# Patient Record
Sex: FEMALE | Race: BLACK OR AFRICAN AMERICAN | Employment: FULL TIME | URBAN - METROPOLITAN AREA
[De-identification: names, ages, dates, MRNs, and addresses within clinical notes are randomized per-mention and may not be internally consistent; named-entity substitution may affect disease eponyms.]

---

## 2017-05-19 ENCOUNTER — TELEPHONE (OUTPATIENT)
Dept: OBGYN CLINIC | Age: 32
End: 2017-05-19

## 2017-05-19 NOTE — TELEPHONE ENCOUNTER
If this is the first time it has happened then I would just monitor.   If it recurs or she does not stop bleeding after 1 week then needs to schedule appointment for exam.

## 2017-05-19 NOTE — TELEPHONE ENCOUNTER
Call received at  12:49pm            TP 33year old patient last seen in the office on 8/23/16. Patient states her cycle ended on 5/5/17 and for the past 5-6 days she has been cramping and now today she is spotting when wiping . Patient reports this is unusual for her . Patient states she did a UPT this am and it was negative. Patient is wondering whether she needs to be seen.  Please advise

## 2017-05-30 ENCOUNTER — OFFICE VISIT (OUTPATIENT)
Dept: OBGYN CLINIC | Age: 32
End: 2017-05-30

## 2017-05-30 VITALS
WEIGHT: 265 LBS | DIASTOLIC BLOOD PRESSURE: 72 MMHG | HEIGHT: 65 IN | SYSTOLIC BLOOD PRESSURE: 118 MMHG | TEMPERATURE: 98.3 F | BODY MASS INDEX: 44.15 KG/M2

## 2017-05-30 DIAGNOSIS — R10.2 PELVIC PAIN: Primary | ICD-10-CM

## 2017-05-30 DIAGNOSIS — R35.0 URINARY FREQUENCY: ICD-10-CM

## 2017-05-30 DIAGNOSIS — N89.8 VAGINAL DISCHARGE: ICD-10-CM

## 2017-05-30 DIAGNOSIS — N93.9 ABNORMAL UTERINE BLEEDING: ICD-10-CM

## 2017-05-30 DIAGNOSIS — R39.15 URINARY URGENCY: ICD-10-CM

## 2017-05-30 LAB
BILIRUB UR QL STRIP: NEGATIVE
GLUCOSE UR-MCNC: NEGATIVE MG/DL
HCG URINE, QL. (POC): NEGATIVE
KETONES P FAST UR STRIP-MCNC: NORMAL MG/DL
PH UR STRIP: 4.6 [PH] (ref 4.6–8)
PROT UR QL STRIP: NEGATIVE MG/DL
SP GR UR STRIP: 1 (ref 1–1.03)
UA UROBILINOGEN AMB POC: NORMAL (ref 0.2–1)
URINALYSIS CLARITY POC: CLEAR
URINALYSIS COLOR POC: YELLOW
URINE BLOOD POC: NEGATIVE
URINE LEUKOCYTES POC: NEGATIVE
URINE NITRITES POC: NEGATIVE
VALID INTERNAL CONTROL?: YES
WET MOUNT POCT, WMPOCT: NORMAL

## 2017-05-30 RX ORDER — FLUCONAZOLE 150 MG/1
150 TABLET ORAL DAILY
Qty: 1 TAB | Refills: 0 | Status: SHIPPED | OUTPATIENT
Start: 2017-05-30 | End: 2021-02-18

## 2017-05-30 RX ORDER — IBUPROFEN 800 MG/1
800 TABLET ORAL
COMMUNITY
End: 2021-02-23 | Stop reason: ALTCHOICE

## 2017-05-30 RX ORDER — METRONIDAZOLE 500 MG/1
500 TABLET ORAL 2 TIMES DAILY
Qty: 28 TAB | Refills: 0 | Status: SHIPPED | OUTPATIENT
Start: 2017-05-30 | End: 2017-06-13

## 2017-05-30 RX ORDER — DOXYCYCLINE 100 MG/1
100 CAPSULE ORAL 2 TIMES DAILY
Qty: 28 CAP | Refills: 0 | Status: SHIPPED | OUTPATIENT
Start: 2017-05-30 | End: 2017-06-13

## 2017-05-30 NOTE — MR AVS SNAPSHOT
Visit Information Date & Time Provider Department Dept. Phone Encounter #  
 5/30/2017  1:00 PM MD Mikc Batista [phone] [id number] Your Appointments 9/7/2017  8:00 AM  
ESTABLISHED PATIENT with MD Mick Batista ([address]) Appt Note: ae   TP  
 [address] [facility] [address] [facility] [phone]  
[facility] 31 [address] Upcoming Health Maintenance Date Due  
 PAP AKA CERVICAL CYTOLOGY 6/26/2017 INFLUENZA AGE 9 TO ADULT 8/1/2017 Allergies as of 5/30/2017  Review Complete On: 5/30/2017 By: Brooks Leon LPN Severity Noted Reaction Type Reactions Penicillins  05/30/2017    Hives Current Immunizations  Never Reviewed No immunizations on file. Not reviewed this visit You Were Diagnosed With   
  
 Codes Comments Pelvic pain    -  Primary ICD-10-CM: R10.2 ICD-9-CM: [id number] Vaginal discharge     ICD-10-CM: N89.8 ICD-9-CM: 623.5 Vitals BP Height(growth percentile) Weight(growth percentile) LMP BMI OB Status 118/72 5' 5\" (1.651 m) 265 lb (120.2 kg) 05/01/2017 (Exact Date) 44.1 kg/m2 Having regular periods Smoking Status Never Smoker BMI and BSA Data Body Mass Index Body Surface Area  
 44.1 kg/m 2 2.35 m 2 Preferred Pharmacy [facility] Name Phone [id number] [address] AT [address] [place] [address] [phone] Your Updated Medication List  
  
   
This list is accurate as of: 5/30/17  1:27 PM.  Always use your most recent med list.  
  
  
  
  
 doxycycline 100 mg capsule Commonly known as:  Sheila Hugh Take 1 Cap by mouth two (2) times a day for 14 days. ibuprofen 800 mg tablet Commonly known as:  MOTRIN Take  by mouth.  
  
 metroNIDAZOLE 500 mg tablet Commonly known as:  FLAGYL Take 1 Tab by mouth two (2) times a day for 14 days. NATURE-THROID 16.25 mg Tab Generic drug:  Thyroid (Pork) Prescriptions Sent to Pharmacy Refills  
 doxycycline (MONODOX) 100 mg capsule 0 Sig: Take 1 Cap by mouth two (2) times a day for 14 days. Class: Normal  
 Pharmacy: [facility]/pharmacy #[id number] AT [address] [place] [address] Ph #: [phone] Route: Oral  
 metroNIDAZOLE (FLAGYL) 500 mg tablet 0 Sig: Take 1 Tab by mouth two (2) times a day for 14 days. Class: Normal  
 Pharmacy: [facility]/pharmacy #[id number] AT [address] [place] [address] Ph #: [phone] Route: Oral  
  
We Performed the Following AMB POC SMEAR, STAIN & Jamel Billing MOUNT [id number] CPT(R)] CBC WITH AUTOMATED DIFF [13483 CPT(R)] [address] [id number] Custom] Patient Instructions Pelvic Inflammatory Disease: Care Instructions Your Care Instructions Pelvic inflammatory disease, or PID, is an infection of a womans fallopian tubes and other reproductive organs. PID is usually caused by a sexually transmitted infection (STI), such as gonorrhea or chlamydia. PID can cause scars in the fallopian tubes, making it hard for a woman to get pregnant. Having one STI increases your risk for other STIs, such as genital herpes, genital warts, syphilis, and HIV. It is important to take all the medicine that was prescribed. PID can cause serious health problems if you do not complete your treatment. Follow-up care is a key part of your treatment and safety. Be sure to make and go to all appointments, and call your doctor if you are having problems. Its also a good idea to know your test results and keep a list of the medicines you take. How can you care for yourself at home? · Take your antibiotics as directed. Do not stop taking them just because you feel better. You need to take the full course of antibiotics. · Rest until your fever and pain have improved. · Take pain medicines exactly as directed. ¨ If the doctor gave you a prescription medicine for pain, take it as prescribed. ¨ If you are not taking a prescription pain medicine, ask your doctor if you can take an over-the-counter medicine. · Use a hot water bottle or a heating pad (set on low) on your belly for pain. · Do not douche. · Do not have sex or use tampons (you can use pads instead) until you have taken all the medicine, your pain is gone, and you feel completely well. · Talk to any sex partners you have had in the past 2 months. They need to be tested and may need to be treated for STIs. To prevent STIs · Use latex condoms every time you have sex. Use them from the beginning to the end of sexual contact. · Talk to your partner before you have sex. Find out if he or she has or is at risk for any sexually transmitted infection (STI). Keep in mind that a person may be able to spread an STI even if he or she does not have symptoms. · Do not have sex with anyone who has symptoms of an STI, such as sores on the genitals or mouth. · Having one sex partner (who does not have STIs and does not have sex with anyone else) is a good way to avoid STIs. When should you call for help? Call your doctor now or seek immediate medical care if: 
· You have a new or higher fever. · Your pain gets worse. · You think you may be pregnant. Watch closely for changes in your health, and be sure to contact your doctor if: 
· You vomit or have diarrhea. · You are not getting better after 2 days. Where can you learn more? Go to http://arden-celine.info/. Enter N294 in the search box to learn more about \"Pelvic Inflammatory Disease: Care Instructions. \" Current as of: October 13, 2016 Content Version: 11.2 © 0382-7412 Zapstitch.  Care instructions adapted under license by Yoolink (which disclaims liability or warranty for this information). If you have questions about a medical condition or this instruction, always ask your healthcare professional. [address] any warranty or liability for your use of this information. Introducing [facility] & HEALTH SERVICES! Dear [facility]: Thank you for requesting a Cuculus account. Our records indicate that you already have an active Cuculus account. You can access your account anytime at [email or web address] Did you know that you can access your hospital and ER discharge instructions at any time in Cuculus? You can also review all of your test results from your hospital stay or ER visit. Additional Information If you have questions, please visit the Frequently Asked Questions section of the Cuculus website at [email or web address] Remember, Cuculus is NOT to be used for urgent needs. For medical emergencies, dial 911. Now available from your iPhone and Android! Please provide this summary of care documentation to your next provider. Your primary care clinician is listed as NOT ON FILE. If you have any questions after today's visit, please call [phone].

## 2017-05-30 NOTE — PROGRESS NOTES
[address] [facility]  [email or web address]  [phone]    Osmany Cowan MD, FACOG       OB/GYN Problem visit    Chief Complaint:   Chief Complaint   Patient presents with   [facility] ED Follow-up    Pelvic Pain    Irregular Menses       History of Present Illness: This is a new problem being evaluated by this provider. The patient is a 33 y.o. [de-identified]  female who reports having spotting X1 day 2 weeks ago with severe pelvice pain that took her to the ER on Thursday 5/25/17. Patient reports that she also has lower back pain, urinary urgency, urinary frequency, and bloated/swollen abdomen. She reports the symptoms are is unchanged. Aggravating factors include pelvic exam.   Alleviating factors include Ibuprofen 800mg, hydrocodone  +new sexual partner    She does not have other concerns. LMP: Patient's last menstrual period was 05/01/2017 (exact date). PFSH:  Past Medical History:   Diagnosis Date    Chlamydia     Hirsutism     History of PCOS     Hypothyroidism     Pap smear for cervical cancer screening 1/24/12    negative    Pap smear for cervical cancer screening 6/26/14    negative hpv negative     No past surgical history on file. Family History   Problem Relation Age of Onset    Hypertension Mother     Coronary Artery Disease Mother     Osteoporosis Mother     No Known Problems Father     Osteoporosis Maternal Aunt     Breast Cancer Paternal Aunt     Breast Cancer Other      Social History   Substance Use Topics    Smoking status: Never Smoker    Smokeless tobacco: Never Used    Alcohol use No     Allergies   Allergen Reactions    Penicillins Hives     Current Outpatient Prescriptions   Medication Sig    ibuprofen (MOTRIN) 800 mg tablet Take  by mouth.  doxycycline (MONODOX) 100 mg capsule Take 1 Cap by mouth two (2) times a day for 14 days.  metroNIDAZOLE (FLAGYL) 500 mg tablet Take 1 Tab by mouth two (2) times a day for 14 days.     fluconazole (DIFLUCAN) 150 mg tablet Take 1 Tab by mouth daily.  NATURE-THROID 16.25 mg tab      No current facility-administered medications for this visit. Review of Systems:  History obtained from the patient  Constitutional: negative for fevers, chills and weight loss  ENT ROS: negative for - hearing change, oral lesions or visual changes  Respiratory: negative for cough, wheezing or dyspnea on exertion  Cardiovascular: negative for chest pain, irregular heart beats, exertional chest pressure/discomfort  Gastrointestinal: negative for dysphagia, nausea and vomiting  Genito-Urinary ROS:  see HPI  Inteument/breast: negative for rash, breast lump and nipple discharge  Musculoskeletal:negative for stiff joints, neck pain and muscle weakness  Endocrine ROS: negative for - breast changes, galactorrhea or temperature intolerance  Hematological and Lymphatic ROS: negative for - blood clots, bruising or swollen lymph nodes    Physical Exam:  Visit Vitals    /72    Temp 98.3 °F (36.8 °C)    Ht 5' 5\" (1.651 m)    Wt 265 lb (120.2 kg)    BMI 44.1 kg/m2       GENERAL: alert, well appearing, and in no distress  HEAD: normocephalic, atraumatic. PULM: clear to auscultation, no wheezes, rales or rhonchi, symmetric air entry   COR: normal rate and regular rhythm, S1 and S2 normal   ABDOMEN: soft, nontender, nondistended, no masses or organomegaly   EGBUS: no lesions, no inflammation, no masses  VULVA: normal appearing vulva with no masses, tenderness or lesions  VAGINA: normal appearing vagina with normal color, no lesions, white and thin discharge  CERVIX: normal appearing cervix without discharge or lesions, mild CMT   UTERUS: uterus is normal size, shape, consistency and nontender   ADNEXA: normal adnexa in size, nontender and no masses  NEURO: alert, oriented, normal speech    Assessment:  Encounter Diagnoses   Name Primary?     Pelvic pain Yes    Vaginal discharge     Abnormal uterine bleeding     Urinary frequency     Urinary urgency        Plan:  The patient is advised that she should contact the office if she does not note improvement or if symptoms recur  Recommend follow up with PCP for non-gynecologic complaints and chronic medical problems. She should contact our office with any questions or concerns  She could keep her routine annual exam appointment. We discussed potential causes of lower abdominal/pelvic pain: GYN, GI, , musculoskeletal, infectious process, adhesions, or other etiology  We discussed evaluation of lower abdominal/pelvic pain: including but not limited to observation, surgical evaluation/laparoscopy, imaging   We discussed treatment of lower abdominal/pelvic pain: including but not limited to: pain medication, hormonal management, surgical intervention, bowel regimen. Since pain can be a symptoms of an underlying abnormal process she is encouraged to contact my office with persistent symptoms for additional evaluation and treatment if needed. Reviewed outside ER records: urine, +bacteria,   CT: \"normal\" genito urinary and abd  No abx given  We discussed potential causes of symptomatic bleeding: including but not limited to hormonal, medical, infection/inflammation and structural etiologies.   Pain precautions  Appendicitis precautions  Notify MD if NI      Orders Placed This Encounter    CBC WITH AUTOMATED DIFF    NUSWAB VAGINITIS PLUS    AMB POC WET PREP (AKA STAIN, INTERPRET, WET MOUNT)    AMB POC URINALYSIS DIP STICK MANUAL W/O MICRO    AMB POC URINE PREGNANCY TEST, VISUAL COLOR COMPARISON    doxycycline (MONODOX) 100 mg capsule    metroNIDAZOLE (FLAGYL) 500 mg tablet    fluconazole (DIFLUCAN) 150 mg tablet       Results for orders placed or performed in visit on 05/30/17   AMB POC SMEAR, STAIN & INTERPRET, WET MOUNT   Result Value Ref Range    Wet mount (POC)      Narrative    BRIAN    Hypae: negative  Buds: negative    Wet Prep:  Trich: negative  Clue cells: negative  Hyphae: negative  Buds: negative  WBC's: normal     AMB POC URINALYSIS DIP STICK MANUAL W/O MICRO   Result Value Ref Range    Color (UA POC) Yellow     Clarity (UA POC) Clear     Glucose (UA POC) Negative Negative    Bilirubin (UA POC) Negative Negative    Ketones (UA POC) Trace Negative    Specific gravity (UA POC) 1.001 1.001 - 1.035    Blood (UA POC) Negative Negative    pH (UA POC) 4.6 4.6 - 8.0    Protein (UA POC) Negative Negative mg/dL    Urobilinogen (UA POC) normal 0.2 - 1    Nitrites (UA POC) Negative Negative    Leukocyte esterase (UA POC) Negative Negative   AMB POC URINE PREGNANCY TEST, VISUAL COLOR COMPARISON   Result Value Ref Range    VALID INTERNAL CONTROL POC Yes     HCG urine, Ql. (POC) Negative Negative

## 2017-05-31 LAB
BASOPHILS # BLD AUTO: 0 X10E3/UL (ref 0–0.2)
BASOPHILS NFR BLD AUTO: 0 %
EOSINOPHIL # BLD AUTO: 0.3 X10E3/UL (ref 0–0.4)
EOSINOPHIL NFR BLD AUTO: 3 %
ERYTHROCYTE [DISTWIDTH] IN BLOOD BY AUTOMATED COUNT: 13.8 % (ref 12.3–15.4)
HCT VFR BLD AUTO: 40.4 % (ref 34–46.6)
HGB BLD-MCNC: 14 G/DL (ref 11.1–15.9)
IMM GRANULOCYTES # BLD: 0 X10E3/UL (ref 0–0.1)
IMM GRANULOCYTES NFR BLD: 0 %
LYMPHOCYTES # BLD AUTO: 2.9 X10E3/UL (ref 0.7–3.1)
LYMPHOCYTES NFR BLD AUTO: 29 %
MCH RBC QN AUTO: 29.9 PG (ref 26.6–33)
MCHC RBC AUTO-ENTMCNC: 34.7 G/DL (ref 31.5–35.7)
MCV RBC AUTO: 86 FL (ref 79–97)
MONOCYTES # BLD AUTO: 0.5 X10E3/UL (ref 0.1–0.9)
MONOCYTES NFR BLD AUTO: 5 %
NEUTROPHILS # BLD AUTO: 6.3 X10E3/UL (ref 1.4–7)
NEUTROPHILS NFR BLD AUTO: 63 %
PLATELET # BLD AUTO: 280 X10E3/UL (ref 150–379)
RBC # BLD AUTO: 4.69 X10E6/UL (ref 3.77–5.28)
WBC # BLD AUTO: 10.1 X10E3/UL (ref 3.4–10.8)

## 2017-06-02 ENCOUNTER — TELEPHONE (OUTPATIENT)
Dept: OBGYN CLINIC | Age: 32
End: 2017-06-02

## 2017-06-02 LAB
A VAGINAE DNA VAG QL NAA+PROBE: NORMAL SCORE
BVAB2 DNA VAG QL NAA+PROBE: NORMAL SCORE
C ALBICANS DNA VAG QL NAA+PROBE: NEGATIVE
C GLABRATA DNA VAG QL NAA+PROBE: NEGATIVE
C TRACH RRNA SPEC QL NAA+PROBE: NEGATIVE
MEGA1 DNA VAG QL NAA+PROBE: NORMAL SCORE
N GONORRHOEA RRNA SPEC QL NAA+PROBE: NEGATIVE
T VAGINALIS RRNA SPEC QL NAA+PROBE: NEGATIVE

## 2017-06-02 NOTE — TELEPHONE ENCOUNTER
LMTCB    ----- Message from Fernando Lou MD sent at 6/2/2017 10:01 AM EDT -----  The results are normal.   Please notify patient. Recommend f/u if still having symptoms/problems or has additional concerns.

## 2017-06-02 NOTE — PROGRESS NOTES
Patient returned call and was given the results per MD recommendation. She verbalized understanding.

## 2017-06-07 ENCOUNTER — OFFICE VISIT (OUTPATIENT)
Dept: OBGYN CLINIC | Age: 32
End: 2017-06-07

## 2017-06-07 VITALS
WEIGHT: 263 LBS | BODY MASS INDEX: 43.82 KG/M2 | SYSTOLIC BLOOD PRESSURE: 118 MMHG | HEIGHT: 65 IN | DIASTOLIC BLOOD PRESSURE: 74 MMHG

## 2017-06-07 DIAGNOSIS — M25.552 PAIN OF BOTH HIP JOINTS: ICD-10-CM

## 2017-06-07 DIAGNOSIS — M25.551 PAIN OF BOTH HIP JOINTS: ICD-10-CM

## 2017-06-07 DIAGNOSIS — R10.2 PELVIC PAIN IN FEMALE: Primary | ICD-10-CM

## 2017-06-07 DIAGNOSIS — M54.50 BILATERAL LOW BACK PAIN WITHOUT SCIATICA, UNSPECIFIED CHRONICITY: ICD-10-CM

## 2017-06-07 NOTE — MR AVS SNAPSHOT
Visit Information Date & Time Provider Department Dept. Phone Encounter #  
 6/7/2017 10:50 AM MD Mick Salazar [phone] [id number] Your Appointments 9/7/2017  8:00 AM  
ESTABLISHED PATIENT with MD Mick Salazar ([facility]) Appt Note: ae   TP  
 [address] [facility] [address] [facility] [phone]  
[facility] 31 [address] Upcoming Health Maintenance Date Due  
 PAP AKA CERVICAL CYTOLOGY 6/26/2017 INFLUENZA AGE 9 TO ADULT 8/1/2017 Allergies as of 6/7/2017  Review Complete On: 6/7/2017 By: Kyle Sheffield LPN Severity Noted Reaction Type Reactions Penicillins  05/30/2017    Hives Current Immunizations  Never Reviewed No immunizations on file. Not reviewed this visit Vitals BP Height(growth percentile) Weight(growth percentile) LMP BMI OB Status 118/74 5' 5\" (1.651 m) 263 lb (119.3 kg) 05/01/2017 (Exact Date) 43.77 kg/m2 Having regular periods Smoking Status Never Smoker BMI and BSA Data Body Mass Index Body Surface Area 43.77 kg/m 2 2.34 m 2 Preferred Pharmacy Pharmacy Name Phone [id number] [address] [facility] AT [address] [place], [place] [phone] Your Updated Medication List  
  
   
This list is accurate as of: 6/7/17 11:13 AM.  Always use your most recent med list.  
  
  
  
  
 doxycycline 100 mg capsule Commonly known as:  Pearla Getting Take 1 Cap by mouth two (2) times a day for 14 days. fluconazole 150 mg tablet Commonly known as:  DIFLUCAN Take 1 Tab by mouth daily. ibuprofen 800 mg tablet Commonly known as:  MOTRIN Take  by mouth.  
  
 metroNIDAZOLE 500 mg tablet Commonly known as:  FLAGYL Take 1 Tab by mouth two (2) times a day for 14 days. NATURE-THROID 16.25 mg Tab Generic drug:  Thyroid (Pork) Patient Instructions Pelvic Pain: Care Instructions Your Care Instructions Pelvic pain, or pain in the lower belly, can have many causes. Often pelvic pain is not serious and gets better in a few days. If your pain continues or gets worse, you may need tests and treatment. Tell your doctor about any new symptoms. These may be signs of a serious problem. Follow-up care is a key part of your treatment and safety. Be sure to make and go to all appointments, and call your doctor if you are having problems. It's also a good idea to know your test results and keep a list of the medicines you take. How can you care for yourself at home? · Rest until you feel better. Lie down, and raise your legs by placing a pillow under your knees. · Drink plenty of fluids. You may find that small, frequent sips are easier on your stomach than if you drink a lot at once. Avoid drinks with carbonation or caffeine, such as soda pop, tea, or coffee. · Try eating several small meals instead of 2 or 3 large ones. Eat mild foods, such as rice, dry toast or crackers, bananas, and applesauce. Avoid fatty and spicy foods, other fruits, and alcohol until 48 hours after your symptoms have gone away. · Take an over-the-counter pain medicine, such as acetaminophen (Tylenol), ibuprofen (Advil, Motrin), or naproxen (Aleve). Read and follow all instructions on the label. · Do not take two or more pain medicines at the same time unless the doctor told you to. Many pain medicines have acetaminophen, which is Tylenol. Too much acetaminophen (Tylenol) can be harmful. · You can put a heating pad, a warm cloth, or moist heat on your belly to relieve pain. When should you call for help? Call 911 anytime you think you may need emergency care. For example, call if: 
· You passed out (lost consciousness). Call your doctor now or seek immediate medical care if: 
· Your pain is getting worse. · Your pain becomes focused in one area of your belly. · You have severe vaginal bleeding. Severe means that you are soaking through your usual pads or tampons every hour for 2 or more hours and passing clots of blood. · You have new symptoms such as fever, urinary problems or unexpected vaginal bleeding. · You are dizzy or lightheaded, or you feel like you may faint. Watch closely for changes in your health, and be sure to contact your doctor if: 
· You do not get better as expected. Where can you learn more? Go to http://arden-celine.info/. Enter [phone] in the search box to learn more about \"Pelvic Pain: Care Instructions. \" Current as of: October 13, 2016 Content Version: 11.2 © 5167-9331 Dely. Care instructions adapted under license by Nomiku (which disclaims liability or warranty for this information). If you have questions about a medical condition or this instruction, always ask your healthcare professional. [place] [address] any warranty or liability for your use of this information. Introducing hospitals & HEALTH SERVICES! Dear Sue Mata: Thank you for requesting a Dispatch account. Our records indicate that you already have an active Dispatch account. You can access your account anytime at [email or web address] Did you know that you can access your hospital and ER discharge instructions at any time in Dispatch? You can also review all of your test results from your hospital stay or ER visit. Additional Information If you have questions, please visit the Frequently Asked Questions section of the Dispatch website at [email or web address] Remember, Dispatch is NOT to be used for urgent needs. For medical emergencies, dial 911. Now available from your iPhone and Android! Please provide this summary of care documentation to your next provider. Your primary care clinician is listed as NOT ON FILE. If you have any questions after today's visit, please call [phone].

## 2017-06-07 NOTE — PATIENT INSTRUCTIONS
Pelvic Pain: Care Instructions  Your Care Instructions    Pelvic pain, or pain in the lower belly, can have many causes. Often pelvic pain is not serious and gets better in a few days. If your pain continues or gets worse, you may need tests and treatment. Tell your doctor about any new symptoms. These may be signs of a serious problem. Follow-up care is a key part of your treatment and safety. Be sure to make and go to all appointments, and call your doctor if you are having problems. It's also a good idea to know your test results and keep a list of the medicines you take. How can you care for yourself at home? · Rest until you feel better. Lie down, and raise your legs by placing a pillow under your knees. · Drink plenty of fluids. You may find that small, frequent sips are easier on your stomach than if you drink a lot at once. Avoid drinks with carbonation or caffeine, such as soda pop, tea, or coffee. · Try eating several small meals instead of 2 or 3 large ones. Eat mild foods, such as rice, dry toast or crackers, bananas, and applesauce. Avoid fatty and spicy foods, other fruits, and alcohol until 48 hours after your symptoms have gone away. · Take an over-the-counter pain medicine, such as acetaminophen (Tylenol), ibuprofen (Advil, Motrin), or naproxen (Aleve). Read and follow all instructions on the label. · Do not take two or more pain medicines at the same time unless the doctor told you to. Many pain medicines have acetaminophen, which is Tylenol. Too much acetaminophen (Tylenol) can be harmful. · You can put a heating pad, a warm cloth, or moist heat on your belly to relieve pain. When should you call for help? Call 911 anytime you think you may need emergency care. For example, call if:  · You passed out (lost consciousness). Call your doctor now or seek immediate medical care if:  · Your pain is getting worse. · Your pain becomes focused in one area of your belly.   · You have severe vaginal bleeding. Severe means that you are soaking through your usual pads or tampons every hour for 2 or more hours and passing clots of blood. · You have new symptoms such as fever, urinary problems or unexpected vaginal bleeding. · You are dizzy or lightheaded, or you feel like you may faint. Watch closely for changes in your health, and be sure to contact your doctor if:  · You do not get better as expected. Where can you learn more? Go to http://arden-celine.info/. Enter [phone] in the search box to learn more about \"Pelvic Pain: Care Instructions. \"  Current as of: October 13, 2016  Content Version: 11.2  © 1358-6738 WalletKit, Wizpert. Care instructions adapted under license by Push Computing (which disclaims liability or warranty for this information). If you have questions about a medical condition or this instruction, always ask your healthcare professional. [address] any warranty or liability for your use of this information.

## 2017-06-07 NOTE — PROGRESS NOTES
[facility]-GYN  [email or web address]    Osmany Cowan MD, [address] [facility]       OB/GYN Follow-up visit    Chief Complaint: Follow up visit  Chief Complaint   Patient presents with    Pelvic Pain    Back Pain       History of Present Illness: This is a follow up visit from 5/30/17. She is having a follow up for pelvic pain and bloating. Pain is radiating to around umbilicus and bilateral lower back. The patient reports having pelvic pain, lower back pain, and bloating. She reports the symptoms are is moderately worse. Aggravating factors include none. Alleviating factors include ibuprofen helps somewhat. She does not have other concerns. Has tried to conceive for several months in the past, she is worried about future fertility. +FH fibroids. AUB resolved  Thyroid: normal per pt      LMP: Patient's last menstrual period was 05/01/2017 (exact date). PFSH:  Past Medical History:   Diagnosis Date    Chlamydia     Hirsutism     History of PCOS     Hypothyroidism     Pap smear for cervical cancer screening 1/24/12    negative    Pap smear for cervical cancer screening 6/26/14    negative hpv negative     No past surgical history on file. Family History   Problem Relation Age of Onset    Hypertension Mother     Coronary Artery Disease Mother     Osteoporosis Mother     No Known Problems Father     Osteoporosis Maternal Aunt     Breast Cancer Paternal Aunt     Breast Cancer Other      Social History   Substance Use Topics    Smoking status: Never Smoker    Smokeless tobacco: Never Used    Alcohol use No     Allergies   Allergen Reactions    Penicillins Hives     Current Outpatient Prescriptions   Medication Sig    ibuprofen (MOTRIN) 800 mg tablet Take  by mouth.  doxycycline (MONODOX) 100 mg capsule Take 1 Cap by mouth two (2) times a day for 14 days.  metroNIDAZOLE (FLAGYL) 500 mg tablet Take 1 Tab by mouth two (2) times a day for 14 days.     NATURE-THROID 16.25 mg tab     fluconazole (DIFLUCAN) 150 mg tablet Take 1 Tab by mouth daily. No current facility-administered medications for this visit. Review of Systems:  History obtained from the patient  Constitutional: negative for fevers, chills and weight loss  ENT ROS: negative for - hearing change, oral lesions or visual changes  Respiratory: negative for cough, wheezing or dyspnea on exertion  Cardiovascular: negative for chest pain, irregular heart beats, exertional chest pressure/discomfort  Gastrointestinal: no constipation/diarrhea  Genito-Urinary ROS: see HPI  Inteument/breast: negative for rash, breast lump and nipple discharge  Musculoskeletal:negative for stiff joints, neck pain and muscle weakness  Endocrine ROS: negative for - breast changes, galactorrhea or temperature intolerance  Hematological and Lymphatic ROS: negative for - blood clots, bruising or swollen lymph nodes    Physical Exam:  Visit Vitals    /74    Ht 5' 5\" (1.651 m)    Wt 263 lb (119.3 kg)    BMI 43.77 kg/m2       GENERAL: alert, well appearing, and in no distress  PULM: clear to auscultation, no wheezes, rales or rhonchi, symmetric air entry   COR: normal rate and regular rhythm, S1 and S2 normal   ABDOMEN: soft, nontender, nondistended, no masses or organomegaly   EGBUS: no lesions, no inflammation, no masses  VULVA: normal appearing vulva with no masses, tenderness or lesions  VAGINA: normal appearing vagina with normal color, no lesions, no discharge  CERVIX: normal appearing cervix without discharge or lesions, non tender  UTERUS: uterus is normal size, shape, consistency and nontender   ADNEXA: normal adnexa in size, nontender and no masses  NEURO: alert, oriented, normal speech  BACK no cvat    Assessment:  Encounter Diagnoses   Name Primary?     Pelvic pain in female Yes    Bilateral low back pain without sciatica, unspecified chronicity     Pain of both hip joints        Plan:  The patient is advised that she should contact the office with any questions or concerns. She should make her routine annual gynecologic appointment if needed. Pain precautions  Disc that myoma is unlikely to be causing pain, but could contribute to cramping/AUB  Disc hormonal options for pelvic pain of unknown etiology  We disc that US does not rule out endometriosis   Disc option for HSG if infertility x 12+ months  HSG h/o given  PT referral  Disc considering exploring MS//GI etiology or trial of hormonal management  Disc safer NSAID dosing  Notify MD if NI  FU 2- 3 mos reevlaute sx or sooner prn  Discussed risks, benefits and alternatives of OCP: including but not limited to dvt/pe/mi/cva/ca/gi risks. We discussed progesterone only and non hormonal options for contraception including but not limited to condoms, IUDs, Nexplanon, and depo provera. We discussed potential causes of lower abdominal/pelvic pain: GYN, GI, , musculoskeletal, infectious process, adhesions, or other etiology  We discussed evaluation of lower abdominal/pelvic pain: including but not limited to observation, surgical evaluation/laparoscopy, imaging   We discussed treatment of lower abdominal/pelvic pain: including but not limited to: pain medication, hormonal management, surgical intervention, bowel regimen. Since pain can be a symptoms of an underlying abnormal process she is encouraged to contact my office with persistent symptoms for additional evaluation and treatment if needed. Disc laparoscopy or depo lupron to evaluate for endometriosis  Rec healthy diet regular exercise. Orders Placed This Encounter    REFERRAL TO PHYSICAL THERAPY       No results found for this visit on 06/07/17. Agustín Burns MD    Physician review of ultrasound performed by technician    Today's ultrasound report and images were reviewed and discussed with the patient.   Please see images and imaging report entered by technician in PACS for more detail and progress note and diagnosis entered by MD.    Fareed Ennis MD    DIFFICULT SCAN DUE TO PATIENT BODY HABITUS. BMI>40, LIMITED VISUALIZATION  TA SCAN PERFORMED. UTERUS IS ANTEVERTED AND NORMAL SIZE AND HETEROGENOUS IN ECHOGENECITY. THERE IS A  SONOLUCENT AREA NOTED IN THE POSTERIOR UTERINE WALL. ENDOMETRIUM MEASURES 11MM IN THICKNESS. RIGHT OVARY APPEARS WITHIN NORMAL LIMITS. LEFT OVARY IS NOT VISUALIZED. NO FREE FLUID SEEN IN THE CDS. A TV US WOULD BE HELPFUL. UTERUS IS ANTEVERTED, NORMAL IN SIZE AND HETEROGENOUS IN ECHOGENICITY. THERE IS WHAT  APPEARS TO BE A FIBROID. FIBROID, RIGHT, SEE MEASUREMENTS ABOVE.  ENDOMETRIUM MEASURES 12MM IN THICKNESS. THE FIBROID IN THE RIGHT ADNEXA APPEARS TO  EXTENT INTO THE ENDOMETRIUM. RIGHT AND LEFT OVARIES APPEAR TO CONTAIN MULTIPLE SUBCORTICAL ATRETIC FOLLICULAR  CYSTS. .  NO FREE FLUID SEEN IN THE CDS.

## 2017-07-05 ENCOUNTER — HOSPITAL ENCOUNTER (OUTPATIENT)
Dept: PHYSICAL THERAPY | Age: 32
Discharge: HOME | End: 2017-07-05
Payer: COMMERCIAL

## 2017-07-05 PROCEDURE — 97162 PT EVAL MOD COMPLEX 30 MIN: CPT | Performed by: PHYSICAL MEDICINE & REHABILITATION

## 2017-07-05 PROCEDURE — 97530 THERAPEUTIC ACTIVITIES: CPT | Performed by: PHYSICAL MEDICINE & REHABILITATION

## 2017-07-05 PROCEDURE — 97110 THERAPEUTIC EXERCISES: CPT | Performed by: PHYSICAL MEDICINE & REHABILITATION

## 2017-07-05 NOTE — PROGRESS NOTES
PT INITIAL EVALUATION NOTE 2-15    Patient Name: Atlee Snellen  Date:2017  : 1985  [x]  Patient  Verified  Payor: BLUE CROSS / Plan: [facility] PPO / Product Type: PPO /    In time:1045  Out time:1200  Total Treatment Time (min): 75  Visit #: 1     Treatment Area: Pelvic and perineal pain [R10.2]  Low back pain [M54.5]  Pain in right hip [M25.551]  Pain in left hip [M25.552]    SUBJECTIVE  Pain Level (0-10 scale): 3/10  Any medication changes, allergies to medications, adverse drug reactions, diagnosis change, or new procedure performed?: [] No    [x] Yes (see summary sheet for update)  Subjective:     Patient is a 33year old female with complaints of pain in the area of the \"right ovary\" that is constant but worse around the time of ovulation and when she is about to start her cycle. Pain is severe at times. She  has LBP with this. She has had pain radiate into the R lateral thigh when severe. Complains of heaviness in the uterus, also having new onset of constipation that is now constant. She has a history of IBS with diarrhea, the constipation is a significant change from her usual bowel habits. When she is flared the pain can be severe, she has been to the ER 2x due to pain. When flared it can take 4-5 days to calm back to baseline. History of low thyroid, on natural synthetic thyroid treatment which she feels is not working, reports possible PCOS. She has a cycle every 33 days. She is currently trying to conceive. She has been working with a  at a gym doing intense \"boot camp\" and boxing activities, recent 60lb weight loss. Her pain is limiting her ability to continue exercising regularly. She works in [employer], sits primarily. Pain is worse with sitting. She denies any bowel or bladder incontinence. She denies pain with intercourse. She states her pain started 17 (ER visit that day) insidiously. History of gastric ulcers - noted on endoscopy. Negative celiac. Abdominal CT scan (-)  Abdominal and transvaginal US (-)    PLOF: regular exercise  Mechanism of Injury: sudden, insidious  Previous Treatment/Compliance: NA  PMHx/Surgical Hx: No surgical history  Pt Goals: no pain with sitting  Barriers: BMI limits treatment approaches  Motivation: good   Substance use: none   FABQ Score: Late to appointment, no FOTO  Cognition: A & O x 4       OBJECTIVE/EXAMINATION    Posture: In standing shifts weight to L. Other Observations: Morbid obesity  Gait and Functional Mobility: slow gait    Palpation: tender with increased ms turgor along R QL, R lumbar paraspinals in standing. Tender B abdominal ms wall R >L, unable to assess hip flexor due to abdominal wall tenderness. No increased ms turgor noted abdominal ms wall. Joint Mobility: Unable to assess        Lumbar AROM:        R  L  Flexion    Fingertips to shins- painfree           Extension   Full            Side Bending   R SB full          L SB 75% stiff          Rotation   R Rot full  L Rot 50% Pain R QL             Aberrant movements:  Painful arc: [] Yes  [x] No  Instability catch: [] Yes  [x] No  Difficulty returning from flexion: [] Yes  [] No  Reversal of lumbopelvic rhythm: [x] Yes  [] No      LOWER QUARTER   MUSCLE STRENGTH  KEY       R  L  0 - No Contraction  L1, L2 Psoas  4/5  4/5      1 - Trace   L3 Quads  5/5  5/5      2 - Poor   L4 Tib Ant  4/5  5/5      3 - Fair    L5 EHL  4/5  5/5      4 - Good   S1 FHL  5/5  5/5      5 - Normal   S2 Hams  4/5  4/5              MMT:  Core strength: 4/5   Hip abduction:  4/5   Hip extension: 4/5    Neurological: Sensation:  *Absent R DTR patellar tendon. Brisk LE DTRs otherwise.    Special Tests:        Slump:  Pain R post/lateral R thigh   Tenzin: Tight hipflexor R         Chris: tight B ITB bilaterally     SLR: Passive SLR R reproduces pain R low back at 70degrees   MARLO: (+) R groin pain   (-) L       SI compression/ Distraction: Unable to assess   Active SLR: no pain, tight B hamstrings. 15 min Therapeutic Exercise:  [x] See flow sheet :   Rationale: increase ROM to improve the patients ability to decrease pain with sitting    15 min Therapeutic Activity:  []  See flow sheet : Review of her current workout routine. Pt is participating in an intense ex program placing heavy strain on hip flexors, low back, abdominals and pelvic floor. Concerned about R LE radicular pain with absent R PT reflex. Advised pt to discuss with , needs to decrease intensity of workout while participating in PT to better understand effects of PT intervention. Pt in agreement with this plan. Rationale: improve coordination, increase proprioception and decrease strain on structures causing pain  to improve the patients ability to tolerate sitting at work with no pain. With   [x] TE   [] TA   [] neuro   [] other: Patient Education: [x] Review HEP    [] Progressed/Changed HEP based on:   [] positioning   [] body mechanics   [] transfers   [] heat/ice application    [x] other: See written HEP in chart. Other Objective/Functional Measures: Today's evaluation focused on ortho/hip/abdominal wall. Pelvic exam deferred, may proceed next visit pending response to ortho exercises provided today. 45 minutes late to scheduled appointment. Pain Level (0-10 scale) post treatment: 2/10      ASSESSMENT:   Patient with a sudden insidious onset of R pelvic/abdominal/low back pain with some radiation into R LE. She has a history of IBS, possible PCOS, thyroid dysfunction which may be contributory. Today she presented with absent R patellar tendon DTR and R LE focal weakness in the L5/S1 myotomes, other DTR's brisk. She has a positive passive straight leg raise test.  She presented with acute tenderness to abdominal wall R > L as well as tenderness and increased ms turgor to R QL.  Hip flexor on the R is tight, R hip stress testing reproduces chief complaint pain. She is participating in an intense \"boot camp\" style exercise program which may be contributory. Today we focused on R hip flexor spasm, she has been given a home exercise stretching program.  I have advised her to work with her  to decrease the intensity of her workouts while we work to solve her R abdominal/pelvic pain.            [x]  See Plan of Jonh Blas, PT, MSPT   7/5/2017  10:53 AM

## 2017-07-05 NOTE — PROGRESS NOTES
[address] [facility] Immanuel Abarca  Phone: [phone]  Fax: [phone]    Plan of Care/ Statement of Necessity for Physical Therapy Services 2-15    Patient name: Sadie Ashley  : 1985  Provider#: [id number]  Referral source: Keith Lee MD      Medical/Treatment Diagnosis: Pelvic and perineal pain [R10.2]  Low back pain [M54.5]  Pain in right hip [M25.551]  Pain in left hip [M25.552]     Prior Hospitalization: see medical history     Comorbidities: Obesity, PCOS, thyroid dysfunction  Prior Level of Function: No R pelvic/abdominal pain  Medications: Verified on Patient Summary List    Start of Care: 17      Onset Date: 17      The Plan of Care and following information is based on the information from the initial evaluation. Assessment/ key information: Patient is a 33year old female with complaints of pain in the area of the \"right ovary\" that is constant but worse around the time of ovulation and when she is about to start her cycle. Pain is severe at times. She  has LBP with this. She has had pain radiate into the R lateral thigh when severe. Complains of heaviness in the uterus, also having new onset of constipation that is now constant. She has a history of IBS with diarrhea, the constipation is a significant change from her usual bowel habits. When she is flared the pain can be severe, she has been to the ER 2x due to pain. When flared it can take 4-5 days to calm back to baseline. History of low thyroid, on natural synthetic thyroid treatment which she feels is not working, reports possible PCOS. She has a cycle every 33 days. She is currently trying to conceive. She has been working with a  at a gym doing intense \"boot camp\" and boxing activities, recent 60lb weight loss. Her pain is limiting her ability to continue exercising regularly. She works in [employer], sits primarily.  Pain is worse with sitting. She denies any bowel or bladder incontinence. She denies pain with intercourse. Patient with a sudden insidious onset of R pelvic/abdominal/low back pain with some radiation into R LE. She has a history of IBS, possible PCOS, thyroid dysfunction which may be contributory. Today she presented with absent R patellar tendon DTR and R LE focal weakness in the L5/S1 myotomes, other DTR's brisk. She has a positive passive straight leg raise test.  She presented with acute tenderness to abdominal wall R > L as well as tenderness and increased ms turgor to R QL. Hip flexor on the R is tight, R hip stress testing reproduces chief complaint pain. She is participating in an intense \"boot camp\" style exercise program which may be contributory. Today we focused on R hip flexor spasm, she has been given a home exercise stretching program.  I have advised her to work with her  to decrease the intensity of her workouts while we work to solve her R abdominal/pelvic pain.           Evaluation Complexity History HIGH Complexity :3+ comorbidities / personal factors will impact the outcome/ POC ; Examination HIGH Complexity : 4+ Standardized tests and measures addressing body structure, function, activity limitation and / or participation in recreation  ;Presentation MEDIUM Complexity : Evolving with changing characteristics    Overall Complexity Rating: MEDIUM    Problem List: pain affecting function, decrease strength, impaired gait/ balance, decrease ADL/ functional abilitiies, decrease activity tolerance and decrease flexibility/ joint mobility   Treatment Plan may include any combination of the following: Therapeutic exercise, Therapeutic activities, Neuromuscular re-education, Physical agent/modality, Gait/balance training, Manual therapy, Patient education and Self Care training  Patient / Family readiness to learn indicated by: asking questions  Persons(s) to be included in education: patient (P)  Barriers to Learning/Limitations: None  Patient Goal (s): no pain with sitting, return to exercise daily  Patient Self Reported Health Status: good  Rehabilitation Potential: good    Short Term Goals: To be accomplished in 6 weeks:  Patient will be independent with a progressive HEP  Patient will demonstrate good sitting posture with appropriate lumbar support and positioning  Patient will have no pain with palpation to abdominal ms wall, QL  Patient will have full trunk ROM with no pain    Long Term Goals: To be accomplished in 12 weeks:  Patient will have no pain with sitting 30 mintues  Patient will participate in an exercise program 5 days a week with no pain  Complete pelvic exam as indicated. Frequency / Duration: Patient to be seen 2 times per week for up to 12 weeks. Patient/ Caregiver education and instruction: self care, activity modification and exercises    [x]  Plan of care has been reviewed with VERONIKA Fierro, PT, MSPT   7/5/2017 2:21 PM    ________________________________________________________________________    I certify that the above Therapy Services are being furnished while the patient is under my care. I agree with the treatment plan and certify that this therapy is necessary.     [de-identified] Signature:____________________  Date:____________Time: _________

## 2017-07-13 ENCOUNTER — APPOINTMENT (OUTPATIENT)
Dept: PHYSICAL THERAPY | Age: 32
End: 2017-07-13
Payer: COMMERCIAL

## 2017-07-26 ENCOUNTER — HOSPITAL ENCOUNTER (OUTPATIENT)
Dept: PHYSICAL THERAPY | Age: 32
Discharge: HOME | End: 2017-07-26
Payer: COMMERCIAL

## 2017-07-26 PROCEDURE — 97110 THERAPEUTIC EXERCISES: CPT | Performed by: PHYSICAL MEDICINE & REHABILITATION

## 2017-07-26 PROCEDURE — 97530 THERAPEUTIC ACTIVITIES: CPT | Performed by: PHYSICAL MEDICINE & REHABILITATION

## 2017-07-26 NOTE — PROGRESS NOTES
PT DAILY TREATMENT NOTE 2-15    Patient Name: Atlee Snellen  Date:2017  : 1985  [x]  Patient  Verified  Payor: BLUE CROSS / Plan: [facility] PPO / Product Type: PPO /    In time: 7:00  Out time: 8:00  Total Treatment Time (min): 60  Visit #: 2     Treatment Area: Pelvic and perineal pain [R10.2]  Low back pain [M54.5]  Pain in right hip [M25.551]  Pain in left hip [M25.552]    SUBJECTIVE  Pain Level (0-10 scale): 3/10  Any medication changes, allergies to medications, adverse drug reactions, diagnosis change, or new procedure performed?: [x] No    [] Yes (see summary sheet for update)  Subjective functional status/changes:   [] No changes reported  Went on a mission trip to [place] last week, had a significant improvement in her constipation. Was eating fruit every meal, high fiber diet. Overall had a significant reduction in her stress level while away. Abdominal pain reduced with improved bowel habits. Since returning her constipation has returned, abdominal pain returned, stress returned. Pt reports she is having a bowel movement every 3-5 days, rated on the [facility] Stool Chart at 1. OBJECTIVE    15 min Therapeutic Exercise:  [x] See flow sheet :   Rationale: increase ROM and increase proprioception to improve the patients ability to decrease pelvic floor guarding, puborectalis guarding. 45 min Therapeutic Activity:  []  See flow sheet :  Patient education regarding dietary and hydration changes to improve stool quality, soluble vs insoluble fiber, goal 30g soluble fiber. Education    Rationale: improve coordination and increase proprioception  to improve the patients ability to decrease pelvic floor ms guarding, improve motility, improve bowel function, reduce constipation.             With   [x] TE   [x] TA   [] neuro   [] other: Patient Education: [x] Review HEP    [] Progressed/Changed HEP based on:   [] positioning   [] body mechanics   [] transfers   [] heat/ice application    [] other:      Other Objective/Functional Measures: --     Pain Level (0-10 scale) post treatment: 0/10    ASSESSMENT/Changes in Function:     Patient will continue to benefit from skilled PT services to modify and progress therapeutic interventions, address functional mobility deficits, address ROM deficits, address strength deficits, analyze and address soft tissue restrictions, analyze and cue movement patterns, analyze and modify body mechanics/ergonomics and assess and modify postural abnormalities to attain remaining goals. [x]  See Plan of Care  []  See progress note/recertification  []  See Discharge Summary         Progress towards goals / Updated goals:  Able to advance exercises today with good tolerance. Pt continues to need instruction for correct exercise form and performance. Continues to demonstrate good potential to achieve functional goals stated on the plan of care.       PLAN  [x]  Upgrade activities as tolerated     []  Continue plan of care  []  Update interventions per flow sheet       []  Discharge due to:_  []  Other:_      Angelia Osgood, PT, MSPT 7/26/2017  4:14 PM

## 2017-07-27 ENCOUNTER — APPOINTMENT (OUTPATIENT)
Dept: PHYSICAL THERAPY | Age: 32
End: 2017-07-27
Payer: COMMERCIAL

## 2017-09-07 NOTE — PROGRESS NOTES
[address] [facility] [place], [facility], [address] [place] [address] Drive  Phone: [phone]  Fax: [phone]    Discharge Summary  2-15    Patient name: iRya Brumfield  : 1985  Provider#: [id number]  Referral source: Nae Bran MD      Medical/Treatment Diagnosis: Pelvic and perineal pain [R10.2]  Low back pain [M54.5]  Pain in right hip [M25.551]  Pain in left hip [M25.552]     Prior Hospitalization: see medical history     Comorbidities: See Plan of Care  Prior Level of Function:See Plan of Care  Medications: Verified on Patient Summary List    Start of Care: 17      Onset Date: 17   Visits from Start of Care: 2     Missed Visits: 1  Reporting Period : 17  to  17      ASSESSMENT/SUMMARY OF CARE:  Ms. Rivera was referred to pelvic floor physical therapy for evaluation and treatment of R abdominal and pelvic pain, LBP with some radiation into the R LE. She was seen for 2 visits. Both visits were limited by her late arrival.  Treatment consisted of manual therapy, therapeutic exercise, therapeutic activity, bladder health education, bowel habit education, home exercise program development and progression.  reported she was attempting to conceive while undergoing PT treatment. She did not want to participate in any internal vaginal/rectal exam or treatment. Our focus was on exercise, stretching, posture, bowel habits and good elimination posture. She did not return following her second visit, current functional status is not known. Please see initial evaluation. Chronic, severe constipation appears to be a contributing factor in her abdominal pain. Recommend referral to GI for further evaluation. When she is ready she may benefit from continued PT to address a functional constipation. High stress appears to be a contributing factor.   She is participating in a \"boot camp\" style exercise program which may be contributory to R hip flexor strain and spasm, I did recommend she find a less strenuous program.         Short Term Goals: To be accomplished in 6 weeks:  Patient will be independent with a progressive HEP  MET  Patient will demonstrate good sitting posture with appropriate lumbar support and positioning  MET  Patient will have no pain with palpation to abdominal ms wall, QL  NOT MET  Patient will have full trunk ROM with no pain NOT MET    Long Term Goals: To be accomplished in 12 weeks:  Patient will have no pain with sitting 30 mintues  NOT MET  Patient will participate in an exercise program 5 days a week with no pain  NOT MET  Complete pelvic exam as indicated.  NOT MET    RECOMMENDATIONS:  [x]Discontinue therapy: []Patient has reached or is progressing toward set goals      [x]Patient is non-compliant or has abdicated      []Due to lack of appreciable progress towards set goals      []Other    Soren Vences, PT, MSPT    9/7/2017 12:25 PM

## 2019-12-26 ENCOUNTER — OFFICE VISIT (OUTPATIENT)
Dept: SURGERY | Age: 34
End: 2019-12-26

## 2019-12-26 VITALS
DIASTOLIC BLOOD PRESSURE: 70 MMHG | HEART RATE: 61 BPM | HEIGHT: 65 IN | TEMPERATURE: 97.5 F | OXYGEN SATURATION: 99 % | BODY MASS INDEX: 45.57 KG/M2 | SYSTOLIC BLOOD PRESSURE: 120 MMHG | WEIGHT: 273.5 LBS | RESPIRATION RATE: 16 BRPM

## 2019-12-26 DIAGNOSIS — E66.01 MORBID OBESITY WITH BMI OF 45.0-49.9, ADULT (HCC): Primary | ICD-10-CM

## 2019-12-26 DIAGNOSIS — K21.9 GASTROESOPHAGEAL REFLUX DISEASE, ESOPHAGITIS PRESENCE NOT SPECIFIED: ICD-10-CM

## 2019-12-26 DIAGNOSIS — E28.2 PCOS (POLYCYSTIC OVARIAN SYNDROME): ICD-10-CM

## 2019-12-26 RX ORDER — LEVOTHYROXINE SODIUM 88 UG/1
TABLET ORAL
COMMUNITY

## 2019-12-26 NOTE — PROGRESS NOTES
1. Have you been to the ER, urgent care clinic since your last visit? Hospitalized since your last visit? No    2. Have you seen or consulted any other health care providers outside of the [address] [place] [address] since your last visit? Include any pap smears or colon screening. No    Vertell Meliza Black  Body composition    female  37 y.o. Vitals:    12/26/19 1500   BP: 120/70   Pulse: 61   Resp: 16   Temp: 97.5 °F (36.4 °C)   TempSrc: Oral   SpO2: 99%   Weight: 273 lb 8 oz (124.1 kg)   Height: 5' 5\" (1.651 m)     Body mass index is 45.51 kg/m². Neck- 14.5 in. Waist-47.5in  Hips-61 in.

## 2019-12-26 NOTE — PROGRESS NOTES
HISTORY OF PRESENT ILLNESS  Porsche Che is a 37 y.o. female. HPI   Porsche Che is a new patient seeking surgical treatment for morbid obesity. Body mass index is 45.51 kg/m². Seeking weight reduction treatment to improve fertility.       In person and on line seminar   Obese since puberty   High weight 290 lbs   Low weight 195 lbs (high school)     Diet:  Working on 3 meals / day; drinks juices 20+ oz (trying to stop); eats out 2x/ week  Weight loss plans:  Exercise usually most effective; lost 40 lbs with    Patient Active Problem List   Diagnosis Code    Obesity E66.9    PCOS (polycystic ovarian syndrome) E28.2    Enlarged thyroid E04.9     Past Medical History:   Diagnosis Date    Chlamydia     Hirsutism     History of PCOS     Hypothyroidism     Joint pain     macarena. knees    Pap smear for cervical cancer screening 1/24/12    negative    Pap smear for cervical cancer screening 6/26/14    negative hpv negative     Past Surgical History:   Procedure Laterality Date    HX DILATION AND CURETTAGE      HX HEENT      wisdom teeth removed     Social History     Socioeconomic History    Marital status: SINGLE     Spouse name: Not on file    Number of children: Not on file    Years of education: Not on file    Highest education level: Not on file   Occupational History    Occupation: HR     Comment: [employer]    Social Needs    Financial resource strain: Not on file    Food insecurity:     Worry: Not on file     Inability: Not on file    Transportation needs:     Medical: Not on file     Non-medical: Not on file   Tobacco Use    Smoking status: Never Smoker    Smokeless tobacco: Never Used   Substance and Sexual Activity    Alcohol use: Yes     Frequency: Monthly or less    Drug use: No    Sexual activity: Yes     Partners: Male     Birth control/protection: None   Lifestyle    Physical activity:     Days per week: Not on file     Minutes per session: Not on file    Stress: Not on file   Relationships    Social connections:     Talks on phone: Not on file     Gets together: Not on file     Attends [employer] service: Not on file     Active member of club or organization: Not on file     Attends meetings of clubs or organizations: Not on file     Relationship status: Not on file    Intimate partner violence:     Fear of current or ex partner: Not on file     Emotionally abused: Not on file     Physically abused: Not on file     Forced sexual activity: Not on file   Other Topics Concern    Not on file   Social History Narrative    In the home with partner      Family History   Problem Relation Age of Onset    Hypertension Mother     Coronary Artery Disease Mother     Osteoporosis Mother     No Known Problems Father     Osteoporosis Maternal Aunt     Breast Cancer Paternal Aunt     Breast Cancer Other      Current Outpatient Medications on File Prior to Visit   Medication Sig Dispense Refill    levothyroxine (SYNTHROID) 88 mcg tablet levothyroxine 88 mcg tablet   TAKE 1 TABLET BY MOUTH ONCE DAILY      ibuprofen (MOTRIN) 800 mg tablet Take  by mouth.  fluconazole (DIFLUCAN) 150 mg tablet Take 1 Tab by mouth daily. 1 Tab 0     No current facility-administered medications on file prior to visit. Allergies   Allergen Reactions    Penicillins Hives    Percocet [Oxycodone-Acetaminophen] Hives     PCP Chary Rangel MD    Review of Systems   Constitutional: Positive for malaise/fatigue. HENT: Positive for congestion. Negative for ear pain, hearing loss and tinnitus. Eyes: Positive for blurred vision (contacts ). Respiratory: Positive for shortness of breath. Negative for cough and wheezing. Denies snoring    Cardiovascular: Negative for chest pain, palpitations and leg swelling. Gastrointestinal: Positive for constipation, diarrhea (IBS in the past ) and heartburn (last night was \"really bad\"; OTC prilosec ).  Negative for abdominal pain, blood in stool, melena, nausea and vomiting. Occasional dysphagia and has to \"wash it down\"  Usually with bread  \"wet burps\"     Genitourinary: Negative. Musculoskeletal: Positive for back pain (upper back pain r/t breasts ), falls (legs gave out ) and joint pain (knees). Negative for myalgias and neck pain. Skin: Positive for itching and rash. Skin folds abdomen    Neurological: Positive for dizziness and tingling (hands and feet ). Negative for seizures and headaches. Endo/Heme/Allergies:        PCOS   Fertility issues     Hx anemia r/t menorrhagia    Psychiatric/Behavioral:        Night owl        Physical Exam  Constitutional:       Appearance: She is obese. Comments: Central obesity    Cardiovascular:      Rate and Rhythm: Normal rate and regular rhythm. Pulmonary:      Effort: Pulmonary effort is normal.      Breath sounds: Normal breath sounds. Musculoskeletal: Normal range of motion. Comments: Ambulating independently    Neurological:      Mental Status: She is alert. ASSESSMENT and PLAN    ICD-10-CM ICD-9-CM    1. Morbid obesity with BMI of 45.0-49.9, adult ([facility] 75.) E66.01 278.01     Z68.42 V85.42    2. PCOS (polycystic ovarian syndrome) E28.2 256.4    3. Gastroesophageal reflux disease, esophagitis presence not specified K21.9 530.81      Janelle Luna meets criteria established by the NIH. Without weight reduction, co-morbidities will escalate as well as risk of early mortality. Recommendation is patient could be served with surgical weight reduction, the procedure of either Malabsorptive gastric bypass for treatment of morbid obesity or Sleeve gastrectomy for treatment of morbid obesity. I explained to the patient differences between laparoscopic gastric bypass, laparoscopic adjustable gastric banding, and sleeve gastrectomy procedures. Patient has attended one our informational meeting and has seen our educational materials.  Patient desires to have surgery with Meir Gonsalez Everardo Rios MD.  The procedure of divided gastric bypass with James-en-Y gastrojejunostomy was explained to the patient including the four parts of the operation- 1) loss of appetite, 2) the restrictive phases, 3) the dumping phase, 4) mild malabsorption from the diversion of pancreatic or biliary enzymes. Informed consent was obtained concerning the potential morbidities and mortality of the operation including anastomotic leak, pulmonary embolism, deep vein thrombosis, bleeding, staple- line disruption, stomal stenosis or dilatation, inadequate weight loss, and requirement for life-long vitamin intake. Further information was given concerning a three-day hospitalization with at least one or more nights in a special care unit, protein- enriched liquified diet for two-thee weeks, convalescence for three to six weeks. Information was provided concerning the team approach anesthesia, nursing, and dietary. Pneumatic stockings, Heparin or Lovenox, and wound care management techniques were explained. Abdominal pain, nausea and/or vomiting may occur if eating too fast, too much, or not chewing well enough. With sweets or high fat ingestion, dumping may occur. It was further stressed the approximately three to five percent of patients require endoscopic balloon dilatation of the staple line. Furthermore, a clear discussion of the imperfections of the operation was discussed including the fact that it not effective in every patient because of patient non-compliance and/or mechanical failure. It was hoped, however, that at approaching a ninety percent level, the patients can achieve a mean of seventy percent loss of excess body weight. This is determined partially by patient compliance and exercise as well as making wise choices for the diet.   Sleeve gastrectomy or vertical gastric resection involves a resection of the vast majority of the stomach usually done with a dilator pressed against the right half of the stomach of the lesser curvature. One preserves the pyloric sphincter at the lower end of the stomach and then sequentially staples and divides all the way up to the gastroesophageal junction leaving a small rim of the stomach to the left better known as the angle of His. This procedure significantly reduces the amount that the stomach can hold while removing the part of the stomach that secretes the hormone grehlin and alters the speed of food emptying from the stomach. Once food leaves the stomach, the remainder of the intestinal tract is completely intact and there is no effect on the digestion or absorption of food nutrients and calories. The procedure is intermediate between the adjustable gastric band and the gastric bypass as far as weight loss, potential complications and resolution of comorbid diseases such as Type 2 diabetes, high blood pressure, sleep apnea, arthritic pain, cholesterol disorders to mention a few. The usual complications are that of any procedure which affects the ability of the stomach to accept food at any given time. Those are usually nausea and vomiting which either spontaneously resolve or require endoscopic dilatation. The most serious complication from this surgery is a leak from the staple line usually near the  gastroesophageal junction. The frequency of this serious complication is low and usually heals spontaneously although reoperation may be necessary. The other serious complications are those which can occur with any major surgery and include  Infection, bleeding, blood clots and/or cardiopulmonary problems.     Recommendations:    _x__ Nutrition Evaluation    _x__ Psychological Evaluation    ___ Cardiac Evaluation    ___ Pulmonary Evaluation    ___ Sleep Medicine     ___ [facility]     _x__ Upper GI    ___ Upper endoscopy     ___ Smoking cessation x 30 days pre surgery     ___ Committee       I have reinforced without lifestyle change and behavior modification, Richa Luna would not achieve her weight loss goals. I reviewed risks and complications associated with each procedure. I discussed a diet high in protein, low-fat, low- sugar, limited carbohydrates, and discontinuing use of carbonated beverages. Also discussed physical activity, sleep hygiene, stress management and life long follow up. Alma Clement verbalized understanding and questions were answered to the best of my knowledge and ability. Bariatric surgery educational materials were provided. CC Dave Franco MD  51 minutes spent in face to face with Alma Clement > 50% counseling.

## 2019-12-26 NOTE — PATIENT INSTRUCTIONS
[email or web address] is our coordinator and she will send you a letter about the [address] wellness plan and the upper GI xray test  
You can set up the dietician visit at any time Ask your therapist if they are willing to do your psych evaluation and let Staci Jahkeven know *After your consultation with the Nurse Practitioner or Surgeon, you will receive a letter from the   by mail detailing what your specific insurance requires and any additional testing you may need* You may call to set up your psychological evaluation with any of the providers below: 
 
Hay Otto at [phone], [facility] at [phone], [facility] at [phone] Dr. Steve Colon. LINNEA [phone] [facility] requires a certain number of months of supervised counseling for weight loss, exercise and nutrition before approval for surgery. ** See below for your specific insurance: 
 
2 months: [address] [place] [address], [facility], [facility] 8305, [employer], [employer] 3 months (90 days): [employer], [address] [facility], [address] [facility] [address] 4 months: 
Medicare 6 months (180 days): [address] [place] [address] , Loreto Delatorre, [address] [facility], [address] [facility], [address] [facility], [address] [place] [address], [facility] Naziaias Moritz 723, [facility], [facility], [address] [facility], [address] [facility], [address] ([address] [facility], [address] [place], [place] [address] BaseLEIDY Swain 1 year (12 months): [address] [facility], [address] [facility] [address] Email [email or web address] or call [phone] to get started with your monthly supervised diet counseling.

## 2020-01-24 ENCOUNTER — HOSPITAL ENCOUNTER (OUTPATIENT)
Dept: GENERAL RADIOLOGY | Age: 35
Discharge: HOME | End: 2020-01-24
Attending: NURSE PRACTITIONER
Payer: COMMERCIAL

## 2020-01-24 DIAGNOSIS — K21.9 GASTROESOPHAGEAL REFLUX DISEASE, ESOPHAGITIS PRESENCE NOT SPECIFIED: ICD-10-CM

## 2020-01-24 DIAGNOSIS — E66.01 MORBID OBESITY WITH BMI OF 45.0-49.9, ADULT (HCC): ICD-10-CM

## 2020-01-24 PROCEDURE — 74240 X-RAY XM UPR GI TRC 1CNTRST: CPT

## 2020-08-07 ENCOUNTER — CLINICAL SUPPORT (OUTPATIENT)
Dept: SURGERY | Age: 35
End: 2020-08-07

## 2020-08-07 DIAGNOSIS — E66.01 MORBID OBESITY WITH BMI OF 45.0-49.9, ADULT (HCC): Primary | ICD-10-CM

## 2020-08-07 NOTE — PROGRESS NOTES
Pt was scheduled for pre-operative nutrition evaluation for bariatric surgery on Friday August 7th at 10:00 am over the phone. At the time of the call pt reported she was also on a work conference call. Pt was asked to reschedule to a day/time that she could dedicate uninterrupted time for the appointment. Pt was rescheduled to Monday August 10th at 1:00 pm. Pt was also reminded to complete nutrition evaluation paperwork prior to the appointment.      Yohan Hu   [phone]

## 2020-08-10 ENCOUNTER — CLINICAL SUPPORT (OUTPATIENT)
Dept: SURGERY | Age: 35
End: 2020-08-10

## 2020-08-10 DIAGNOSIS — E66.01 MORBID OBESITY WITH BMI OF 45.0-49.9, ADULT (HCC): Primary | ICD-10-CM

## 2020-08-10 NOTE — PROGRESS NOTES
Pre-operative Bariatric Nutrition Evaluation ()     Date: 8/10/2020   Miriam Melgoza M.D. Name: Reba Moran  :  1985  Age:  38  Gender: Female   Type of Surgery: []           Gastric Bypass   [x]           Sleeve Gastrectomy    ASSESSMENT:    Past Medical History:PCOS, thyroid issues      Medications/Supplements:   Prior to Admission medications    Medication Sig Start Date End Date Taking? Authorizing Provider   levothyroxine (SYNTHROID) 88 mcg tablet levothyroxine 88 mcg tablet   TAKE 1 TABLET BY MOUTH ONCE DAILY    Provider, Historical   ibuprofen (MOTRIN) 800 mg tablet Take  by mouth. Provider, Historical   fluconazole (DIFLUCAN) 150 mg tablet Take 1 Tab by mouth daily. 17   iD Mendoza MD       Food Allergies/Intolerances:intolerant to lactose and onions     Anthropometrics:    Ht:65\"   Recent Office Wt: 273#    IBW: 120#    %IBW: 227%     BMI:45    Category: obesity III     Reported wt history: Pt completing pre-operative evaluation for wt loss surgery over the phone d/t social distancing guidelines d/t COVID-19. Highest adult BW of 293#. Attributes wt gain over the years r/t thyroid condition, decreased physical activity, stressful life events. Has attempted wt loss through various methods with most successful wt loss of 60# . Has been unable to maintain long term or significant wt loss and is now seeking approval for weight loss surgery. Pt will need to complete 6 months of supervised weight loss for insurance requirements.      Exercise/Physical Activity:recently started walking in her neighborhood; is thinking about joining [employer]     Reported Diet History:exercise, boot camp programs, self-directed diets    24 Hour Diet Recall  Breakfast  Eggs, oatmeal or cereal; h/o skipping breakfast or protein shake    Lunch  1/2 tuna sandwich and fruit or vegetables or leftovers    Dinner  [place], sauteed vegetables; eating out is 1-2 times per month    Snacks Minimal snacking, sometimes nuts or fruit    Beverages  \"sweet drinks\", recently cut out soda, recently tea and lemonade, juice; Environment/Psychosocial/Support: Reports good support from friends and family. Pt lives alone and grocery shops and cooks for herself. NUTRITION DIAGNOSIS:  1. Excessive energy intake r/t food and beverage choices evidenced by diet recall. 2. Food and nutrition related knowledge deficit r/t lack of prior exposure to information evidenced by pt seeking nutrition education for sleeve gastrectomy. NUTRITION INTERVENTION:  Pt educated on nutrition recommendations for weight loss surgery, specifically sleeve gastrectomy. Instructed on consuming 3 meals per day starting now. Use the balanced plate method to plan meals, include 3 oz of lean source of protein, 1/2 cup whole grains, unlimited non-starchy vegetables, 1/2 cup fruit and 1 serving of low fat dairy. Utilize handouts listing healthy snack and meal ideas to limit restaurant meals. After surgery measure all meals to 1/2 cup. Each meal will contain a 1/4 cup lean protein and 1/4 cup fruit, non-starchy vegetable or starch (limiting to once per day). Aim for 60 g protein per day. Sip on 48-64 oz of sugar free, calorie free, non-carbonated beverages each day. Do not use a straw. Do not consume beverages 30 minutes before, during or 30 minutes after meals. Read all nutrition labels. Demonstrated and emphasized identifying serving size, total fat, sugar and protein content. Defined low fat as </= 3 g per serving. Discussed lean and extra lean sources of protein. Provided list of low fat cooking methods. Avoid foods with sugar listed in the first 3 ingredients and >/15 g sugar per serving. Excess sugar/fat intake may lead to dumping syndrome. Discussed signs and symptoms of dumping syndrome. Practice mindful eating habits; take small bites, chew thoroughly, avoid distractions, utilize hunger/fullness scale. Consume meals over 20-30 minutes. Attend Bariatric Support Group and increase physical activity (approved per MD) for long term weight maintenance. NUTRITION MONITORING AND EVALUATION:    The following goals were established with patient;  1. Continue to work on eating 3 meals a day consistently. Do not skip meals and use protein shakes PRN. 2. Replace current beverages with calorie-free, sugar-free and non-carbonated alternatives. Practice not drinking with meals and no straws. 3. Maintain current walking regimen. Consider increasing intensity or changing walking route for added challenge. 4. Review nutrition education materials. Follow up next month for supervised weight loss and continued nutrition education. Specific tips and techniques to facilitate compliance with above recommendations were provided and discussed. Nutrition evaluation reveals lifestyle changes are indicated and pt is actively making changes. Recommendations for continued changes were made. Will continue to assess. If further details are desired please feel free to contact me at [phone]. This phone number was also provided to the patient for any further questions or concerns.            Ethelyn Kayser, RD

## 2020-09-10 ENCOUNTER — CLINICAL SUPPORT (OUTPATIENT)
Dept: SURGERY | Age: 35
End: 2020-09-10

## 2020-09-10 DIAGNOSIS — E66.01 MORBID OBESITY WITH BMI OF 45.0-49.9, ADULT (HCC): Primary | ICD-10-CM

## 2020-09-15 NOTE — PROGRESS NOTES
[facility] at [facility]  Supervised Weight Loss     Date:   9/10/2020    Patient's Name: Nishant Maldonado  : 1985    Insurance:  [facility]          Session: 2   6  Surgery: Sleeve Gastrectomy  Surgeon:  Susan Mcwilliams M.D. Height: 65\"   Reported Weight:    279      Lbs. BMI: 45   Pounds Lost since last month: 0               Pounds Gained since last month: 6#    Starting Weight: 273#   Previous Months Weight: 273#  Overall Pounds Lost: 0  Overall Pounds Gained: 6#    Other Pertinent Information: Today's appointment was completed in a virtual setting d/t COVID-[employer]. Today's wt was self-reported by the pt. Smoking Status:  none  Alcohol Intake: 2 drinks, 1 time per week    I have reviewed with pt the guidelines of the supervised wt loss program.  Pt understands the expectations of some wt loss during the program and that wt gain could delay the process. I have also explained that appointments need to be consecutive and missing an appointment may result in starting over. Pt has received this information in writing. Changes that patient has made since last month include:  Exercising 7 times per week, eating slower, drinking more water. Eating Habits and Behaviors  General healthy eating guidelines were discussed. A nutrition lesson was presented on portion control. Patients were instructed implement portion control now using the balanced plate method (1/2 plate non-starchy vegetables, 1/4 plate lean meat, and 1/4 plate whole grains and to include fruit and/or milk at meals or snack). We discussed measuring meals to 1/2 cup total per meal after surgery and appropriate portion progression long term. Patient's current diet habits include: eating 2-3 meals per day. Snacking on rice cakes, fruit and pretzels. Eating chips, pretzels and cereal 4 times per week. Eating baked, grilled, broiled foods. Eating out is 1-3 times per week.  Drinking water, sweetened tea and unsweetened tea. Sometimes emotional eating. Reports   Sometimes emotional eating. Reports food choices, portion sizes and late night eating are biggest barriers to wt loss. Physical Activity/Exercise  We talked about the importance of increasing daily physical activity and beginning to develop an exercise regimen/routine. We talked about exercise as being an important part of long term weight loss after surgery. Comments:  During class, I discussed with patient the importance of getting into an exercise routine. Pt is currently exercising for 45 minutes, 7 times per week for activity. Pt has been encouraged to maintain and increase as tolerated. Behavior Modification       We talked about how to eat more mindfully. Tips and recommendations for how to make these changes were provided. Pt was encouraged to keep a food journal and record what they were taking in daily. Overall Assessment: Pt demonstrates appropriate lifestyle changes evidenced by reported changes. Will continue to assess. Patient-Set Goals:   1. Nutrition - portion control   2. Exercise - maintain exercise   3.  Behavior -mindful eating     Waldo Mixon RD  9/15/2020

## 2020-10-08 ENCOUNTER — CLINICAL SUPPORT (OUTPATIENT)
Dept: SURGERY | Age: 35
End: 2020-10-08

## 2020-10-08 DIAGNOSIS — E66.01 MORBID OBESITY WITH BMI OF 45.0-49.9, ADULT (HCC): Primary | ICD-10-CM

## 2020-10-14 NOTE — PROGRESS NOTES
[facility] at [facility]  Supervised Weight Loss     Date:   10/8/2020    Patient's Name: Isabela Hall  : 1985    Insurance:  [facility]             Session: 3   6  Surgery: Sleeve Gastrectomy                     Surgeon:  Santiago Curtis M.D.      Height: 65\"                 Reported Weight:    278      Lbs. BMI: 45             Pounds Lost since last month: 1               Pounds Gained since last month: 0     Starting Weight: 273#                       Previous Months Weight: 279#  Overall Pounds Lost: 0                    Overall Pounds Gained: 5#     Other Pertinent Information: Today's appointment was completed in a virtual setting d/t COVID-19. Today's wt was self-reported by the pt    Smoking Status:  none  Alcohol Intake: 1 drink, once per week    I have reviewed with pt the guidelines of the supervised wt loss program.  Pt understands the expectations of some wt loss during the program and that wt gain could delay the process. I have also explained that appointments need to be consecutive and missing an appointment may result in starting over. Pt has received this information in writing. Changes that patient has made since last month include:  Drinking more water, eating 3 meals a day, smaller portion sizes. Eating Habits and Behaviors  General healthy eating guidelines were also discussed. Pts were instructed that their plate should be made up 1/2 plate coming from non-starchy vegetables, 1/4 coming from lean meat, and 1/4 of their plate coming from carbohydrates, including fruits, starches, or milk. We discussed measuring meals to 1/2 cup total per meal after surgery. Drinking only calorie-free, sugar-free and non-carbonated beverages. We discussed the importance of drinking 64 ounces of fluid per day to prevent dehydration post-operatively. Patient's current diet habits include: Pt is eating 3 meals per day. Snack choices include fruit and yogurt. Pt is eating refined carbohydrate foods (bread, pasta, rice, potatoes) once daily. Pt is eating sweets/desserts once per month. Pt is using baked, grilled, broiled cooking methods. Pt is eating meals prepared outside of the home 1-3 times per week. Pt is drinking water, sweetened tea, unsweetened tea and juice/smoothies. Pt reports sometimes emotional eating. Physical Activity/Exercise  An exercise presentation was provided including information about exercise programs available both before and after surgery. We talked about the importance of increasing daily physical activity and beginning to develop an exercise regimen/routine. We talked about exercise as being an important part of long term weight loss after surgery. Comments:  During class, I discussed with patient the importance of getting into an exercise routine. Pt is currently exercising for 45 minutes, 7 times per week for activity. Pt has been encouraged to maintain and increase as tolerated. Behavior Modification       We talked about how to eat more mindfully. Tips and recommendations for how to make these changes were provided. Pt was encouraged to keep a food journal and record what they were taking in daily. Overall Assessment: Pt demonstrates appropriate lifestyle changes evidenced by reported changes and reported wt loss. Will continue to assess. Patient-Set Goals:   1. Nutrition - make better choices with sugar sweetened beverages   2. Exercise - maintain 7 days per week of exercise   3.  Behavior -celebrate/socialize without food     Dhara Burris RD  10/14/2020

## 2020-11-10 ENCOUNTER — CLINICAL SUPPORT (OUTPATIENT)
Dept: SURGERY | Age: 35
End: 2020-11-10

## 2020-11-10 DIAGNOSIS — E66.01 MORBID OBESITY WITH BMI OF 45.0-49.9, ADULT (HCC): Primary | ICD-10-CM

## 2020-11-13 NOTE — PROGRESS NOTES
[facility] at [facility]  Supervised Weight Loss     Date:   11/10/2020    Patient's Name: Abel Dominguez  : 1985    Insurance:  [facility]                                          Session:   6  Surgery: Sleeve Gastrectomy                     Surgeon: Shamar Lowe M.D.      Height: 65\"                 [id number] Weight:    276      Lbs.                               BMI: 40             Pounds Lost since last month: 2               Pounds Gained since last month: 0     Starting Weight: 273#                       Previous Months Weight: 278#  Overall Pounds Lost: 0                    Overall Pounds Gained: 3#     Other Pertinent Information: Today's appointment was completed in a virtual setting d/t COVID-19. Today's wt was self-reported by the pt    Smoking Status:  none  Alcohol Intake: 1 drink, 2 times per week    I have reviewed with pt the guidelines of the supervised wt loss program.  Pt understands the expectations of some wt loss during the program and that wt gain could delay the process. I have also explained that appointments need to be consecutive and missing an appointment may result in starting over. Pt has received this information in writing. Changes that patient has made since last month include:  Better sleep, taking more steps, drinking more water. Eating Habits and Behaviors  A nutrition lesson was presented on label reading with specific guidelines provided for limiting added sugars. This information will help increase healthy food choices, promote weight loss and prevent dumping syndrome after gastric bypass. We also reviewed the general nutrition guidelines for bariatric surgery. Patient's current diet habits include: Pt is eating 2-3 meals per day. Snack choices include crackers and hummus. Pt is eating refined carbohydrate foods (bread, pasta, rice, potatoes) 2-3 times per week. Pt is eating sweets/desserts none.  Pt is using baked, grilled, broiled cooking methods. Pt is eating meals prepared outside of the home 1-3 times per week. Pt is drinking water and fruit smoothies/juice. Pt reports sometimes emotional eating. Physical Activity/Exercise  We talked about the importance of increasing daily physical activity and beginning to develop an exercise regimen/routine. We talked about exercise as being an important part of long term weight loss after surgery. Comments:  During class, I discussed with patient the importance of getting into an exercise routine. Pt is currently exercising for 45 minutes, 5-7 times per week for activity. Pt has been encouraged to maintain as tolerated. Behavior Modification       We talked about how to eat more mindfully. Tips and recommendations for how to make these changes were provided. Pt was encouraged to keep a food journal and record what they were taking in daily. Overall Assessment: Pt demonstrates appropriate lifestyle changes evidenced by reported changes and reported wt loss. Will continue to assess. Patient-Set Goals:   1. Nutrition - cut out sugar drinks  2. Exercise - continue [place] exercise   3.  Behavior -reduce stress     Julissa Martin RD  11/13/2020

## 2020-12-08 ENCOUNTER — CLINICAL SUPPORT (OUTPATIENT)
Dept: SURGERY | Age: 35
End: 2020-12-08

## 2020-12-08 DIAGNOSIS — E66.01 MORBID OBESITY WITH BMI OF 45.0-49.9, ADULT (HCC): Primary | ICD-10-CM

## 2020-12-09 NOTE — PROGRESS NOTES
[facility]ists at [facility]  Supervised Weight Loss     Date:   2020    Patient's Name: Ousmane Jerez  : 1985    Insurance:  [facility]                                          Session: 5 of  6  Surgery: Sleeve Gastrectomy                     Surgeon: Faith Cohen M.D.      Height: 65\"                 AXYQOFBL Weight:    276      Lbs.                               BMI: 69             Pounds Lost since last month: 0               Pounds Gained since last month: 0     Starting Weight: 273#                       Previous Months Weight: 276#  Overall Pounds Lost: 0                    Overall Pounds Gained: 3#     Other Pertinent Information: Today's appointment was completed in a virtual setting d/t COVID-[employer]. Today's wt was self-reported by the pt     Smoking Status:  none  Alcohol Intake: 1 drink, 1 time per week    I have reviewed with pt the guidelines of the supervised wt loss program.  Pt understands the expectations of some wt loss during the program and that wt gain could delay the process. I have also explained that classes need to be consecutive. Missing a class may result in starting over. Pt has received this information in writing. Changes that patient has made since last month include:  Lower calorie intake, limiting sweets/sugar, drinking more water. Eating Habits and Behaviors  General healthy eating guidelines were discussed. A nutrition lesson specific to the importance of protein intake after surgery was provided. We discussed food sources of protein, protein supplements and multiple reasons as to why protein is important after bariatric surgery. Pts were instructed to focus on including protein at every meal and practice eating protein first at the meal. Pts were encouraged to sample a protein shake for tolerance. Patients were also instructed to use the balanced plate method for help with portion control and general healthy eating prior to surgery. We discussed measuring meals to 1/2 cup total per meal after surgery. Drinking only calorie-free, sugar-free and non-carbonated beverages. We discussed the importance of drinking 64 ounces of fluid per day to prevent dehydration post-operatively. Patient's current diet habits include: Pt is eating 3 meals per day. Snack choices include hummus and veggies. Pt is eating refined carbohydrate foods (bread, pasta, rice, potatoes) 1-2 times, every 2 weeks. Pt is eating sweets/desserts none. Pt is using baked, grilled, broiled cooking methods. Pt is eating meals prepared outside of the home 1-3 times per month. Pt is drinking water, unsweetened tea, caffeine. Pt reports sometimes emotional eating. Physical Activity/Exercise  We talked about the importance of increasing daily physical activity and beginning to develop an exercise regimen/routine. We talked about exercise as being an important part of long term weight loss after surgery. Comments:  During class, I discussed with patient the importance of getting into an exercise routine. Pt is currently doing high intensity interval training for activity. Pt has been encouraged to maintain as tolerated. Behavior Modification       We talked about how to eat more mindfully. Tips and recommendations for how to make these changes were provided. Pt was encouraged to keep a food journal and record what they were taking in daily. Overall Assessment: Pt demonstrates appropriate lifestyle changes evidenced by reported changes. Will continue to assess. Patient-Set Goals:   1. Nutrition - continue with low calorie diet   2. Exercise - resume exercise after injury   3.  Behavior -monitor boredom snacking at night     Yves Pace RD  12/9/2020

## 2021-01-06 ENCOUNTER — CLINICAL SUPPORT (OUTPATIENT)
Dept: SURGERY | Age: 36
End: 2021-01-06

## 2021-01-06 DIAGNOSIS — E66.01 MORBID OBESITY WITH BMI OF 45.0-49.9, ADULT (HCC): Primary | ICD-10-CM

## 2021-01-08 NOTE — PROGRESS NOTES
[facility]ists at [address] [place]  Supervised Weight Loss     Date:   2021    Patient's Name: Janny Garcia  : 1985    Insurance:  [facility]                                          Session:   Surgery: Sleeve Gastrectomy                     Surgeon: Marybeth Veras M.D.      Height: 65\"                 Reported Weight:    275      Lbs.                               BMI: 11             Pounds Lost since last month: 1               Pounds Gained since last month: 0     Starting Weight: 273#                       Previous Months Weight: 276#  Overall Pounds Lost: 0                    Overall Pounds Gained: 2#     Other Pertinent Information: Today's appointment was completed in a virtual setting d/t COVID-19. Today's wt was self-reported by the pt . Smoking Status:  none  Alcohol Intake: none    I have reviewed with pt the guidelines of the supervised wt loss program.  Pt understands the expectations of some wt loss during the program and that wt gain could delay the process. I have also explained that appointments need to be consecutive and missing an appointment may result in starting over. Pt has received this information in writing. Changes that patient has made since last month include:  More protein, less carbs, improved snack choices. Eating Habits and Behaviors  A nutrition lesson specific to vitamins was provided. We discussed the various reasons for needing vitamins and different types and doses. General healthy eating guidelines were also discussed. Pts were instructed that their plate should be made up 1/2 plate coming from non-starchy vegetables, 1/4 coming from lean meat, and 1/4 of their plate coming from carbohydrates, including fruits, starches, or milk. We discussed measuring meals to 1/2 cup total per meal after surgery. Drinking only calorie-free, sugar-free and non-carbonated beverages.  We discussed the importance of drinking 64 ounces of fluid per day to prevent dehydration post-operatively. Patient's current diet habits include: Pt is eating 3 meals per day. Snack choices include carrots, snap peas and hummus. Pt is eating refined carbohydrate foods (bread, pasta, rice, potatoes) once a day. Pt is eating sweets/desserts none. Pt is using baked, grilled, broiled cooking methods. Pt is eating meals prepared outside of the home 2 times per month. Pt is drinking water, unsweetened tea, juice/smoothies. Pt reports sometimes emotional eating. Physical Activity/Exercise  We talked about the importance of increasing daily physical activity and beginning to develop an exercise regimen/routine. We talked about exercise as being an important part of long term weight loss after surgery. Comments:  During class, I discussed with patient the importance of getting into an exercise routine. Pt is currently exercising for 45 minutes, 4-5 times per week for activity. Pt has been encouraged to maintain as tolerated. Behavior Modification       We talked about how to eat more mindfully and identify emotional eating triggers. Tips and recommendations for how to make these changes were provided. Pt was encouraged to keep a food journal and record what they were taking in daily. Overall Assessment: Pt demonstrates appropriate lifestyle changes evidenced by reported changes and weight maintenance. Demonstrates understanding of general nutrition guidelines for bariatric surgery. Appears to be an appropriate candidate at this time. Patient-Set Goals:   1. Nutrition - eat slower and smaller portions  2. Exercise - continue with current regimen  3.  Behavior -monitor getting off track triggers     Greg Justice, ODILON  1/8/2021

## 2021-01-27 ENCOUNTER — OFFICE VISIT (OUTPATIENT)
Dept: SURGERY | Age: 36
End: 2021-01-27
Payer: COMMERCIAL

## 2021-01-27 VITALS
OXYGEN SATURATION: 95 % | DIASTOLIC BLOOD PRESSURE: 71 MMHG | BODY MASS INDEX: 46.38 KG/M2 | SYSTOLIC BLOOD PRESSURE: 116 MMHG | TEMPERATURE: 98.7 F | WEIGHT: 278.4 LBS | RESPIRATION RATE: 18 BRPM | HEIGHT: 65 IN | HEART RATE: 81 BPM

## 2021-01-27 DIAGNOSIS — E66.01 MORBID OBESITY WITH BMI OF 45.0-49.9, ADULT (HCC): Primary | ICD-10-CM

## 2021-01-27 DIAGNOSIS — E28.2 PCOS (POLYCYSTIC OVARIAN SYNDROME): ICD-10-CM

## 2021-01-27 PROCEDURE — 99212 OFFICE O/P EST SF 10 MIN: CPT | Performed by: SURGERY

## 2021-01-27 NOTE — LETTER
1/28/2021 Patient: Leslie Pandya YOB: 1985 Date of Visit: 1/27/2021 Cat Mcnulty MD 
[facility] [address] [facility] [phone] Via Fax: [phone] Dear Cat Mcnulty MD, Thank you for referring Ms. Pati Arevalo to [address] [facility] AT [place] [address] for evaluation. My notes for this consultation are attached. If you have questions, please do not hesitate to call me. I look forward to following your patient along with you. Sincerely, Zachariah Alvarez MD

## 2021-01-27 NOTE — LETTER
NOTIFICATION RETURN TO WORK / SCHOOL 
 
1/27/2021 10:12 AM 
 
Ms. Alma Clement [address] [place] [address] To Whom It May Concern: 
 
Alma Clement is currently under the care of Ana María Argueta. Ms Batool Mcneal was seen in our office today. If there are questions or concerns please have the patient contact our office. Sincerely, Pasquale Ponce MD

## 2021-01-27 NOTE — PROGRESS NOTES
1. Have you been to the ER, urgent care clinic since your last visit? Hospitalized since your last visit? No    2. Have you seen or consulted any other health care providers outside of the [address] [place] [address] since your last visit? Include any pap smears or colon screening.  No

## 2021-01-28 NOTE — PROGRESS NOTES
Bariatric Surgery Consult    Horacio Welsh is a 38 y.o. female with a history of morbid obesity. Her Height: 5' 5\" (165.1 cm), Weight: 278 lb 6.4 oz (126.3 kg). Body mass index is 46.33 kg/m². She reports that she has been trying to lose weight for 5 years. Her maximum weight was 278 pounds. She has attended our bariatric surgery information seminar. Mirtha Saavedra wants to consider laparoscopic sleeve gastrectomy. Pt is referred by: Crystal Grijalva MD.      Comorbidities:     Bariatric comorbidities present: PCOS and obstructive sleep apnea    Ambulatory status: independent    The patient's reported level of exercise: moderately active. Patient Active Problem List    Diagnosis Date Noted    Enlarged thyroid 11/16/2015    Obesity 06/29/2014    PCOS (polycystic ovarian syndrome) 06/29/2014     Past Medical History:   Diagnosis Date    Chlamydia     Hirsutism     History of PCOS     Hypothyroidism     Joint pain     macarena. knees    Pap smear for cervical cancer screening 1/24/12    negative    Pap smear for cervical cancer screening 6/26/14    negative hpv negative      Past Surgical History:   Procedure Laterality Date    HX DILATION AND CURETTAGE      HX HEENT      wisdom teeth removed      Social History     Tobacco Use    Smoking status: Never Smoker    Smokeless tobacco: Never Used   Substance Use Topics    Alcohol use: Yes     Frequency: Monthly or less      Family History   Problem Relation Age of Onset    Hypertension Mother     Coronary Artery Disease Mother     Osteoporosis Mother     No Known Problems Father     Osteoporosis Maternal Aunt     Breast Cancer Paternal Aunt     Breast Cancer Other       . Current Outpatient Medications   Medication Sig    levothyroxine (SYNTHROID) 88 mcg tablet levothyroxine 88 mcg tablet   TAKE 1 TABLET BY MOUTH ONCE DAILY    ibuprofen (MOTRIN) 800 mg tablet Take  by mouth.  fluconazole (DIFLUCAN) 150 mg tablet Take 1 Tab by mouth daily. No current facility-administered medications for this visit. Allergies   Allergen Reactions    Penicillins Hives    Percocet [Oxycodone-Acetaminophen] Hives         Review of Systems:    Constitutional: negative  Ears, Nose, Mouth, Throat, and Face: negative  Respiratory: negative  Cardiovascular: negative  Gastrointestinal: negative  Genitourinary:negative  Integument/Breast: negative  Hematologic/Lymphatic: negative  Musculoskeletal:negative  Neurological: negative  Behavioral/Psychiatric: negative  Endocrine: negative  Allergic/Immunologic: negative    Objective:     Visit Vitals  /71 (BP 1 Location: Right arm, BP Patient Position: Sitting)   Pulse 81   Temp 98.7 °F (37.1 °C) (Oral)   Resp 18   Ht 5' 5\" (1.651 m)   Wt 278 lb 6.4 oz (126.3 kg)   SpO2 95%   BMI 46.33 kg/m²        Physical Exam:    General:  alert, no distress, morbidly obese   Eyes:  conjunctivae and sclerae normal, pupils equal, round, reactive to light, extraocular movements intact without nystagmus   Throat & Neck: no erythema or exudates noted and neck supple and symmetrical; no palpable masses   Lungs:   clear to auscultation bilaterally   Heart:  Regular rate and rhythm   Abdomen:   obese, soft, nontender, nondistended, no masses or organomegaly,    Extremities: no edema,  no gait disturbances   Skin: Normal.     Upper GI- FINDINGS:   KUB is unremarkable. This is a single contrast study.     The esophagus shows normal contour, caliber, peristalsis and mucosa with no  hiatal hernia or gastroesophageal reflux identified during this exam.      Gastric emptying is prompt. Gastric peristalsis and mucosa are normal in  appearance. The duodenal bulb shows no evidence of stricture or ulcer. The  duodenal sweep and visualized portions of the proximal small bowel are normal.     Air Kerma: 85 mGy.     IMPRESSION  IMPRESSION:  Normal Upper GI Exam.    Assessment:     1.  Morbid obesity (Body mass index is 46.33 kg/m².) with multiple comorbidities. The patient meets criteria established by the NIH for weight loss surgery candidates. Without weight reduction, co-morbidities will escalate as well as increase risk of early mortality. Our recommendation is the patient could be served with laparoscopic sleeve gastrectomy. I explained to the patient differences between laparoscopic gastric bypass, laparoscopic adjustable gastric banding, and laparoscopic vertical sleeve gastrectomy with respect to expected weight loss, resolution of comorbidities and risks. Ms. Jhonatan Dave has attended one our informational meetings and has seen our educational materials. She has requested Dr. Mayito Harvey to perform her procedure. I reviewed the role for this procedure as a tool to help her achieve her weight loss goals. I reminded her that effective weight loss comes from lifelong adherence to changes in dietary choices, eating habits and exercise. Recommendation: We will request approval for laparoscopic sleeve gastrectomy. She appears to be a good candidate for laparoscopic sleeve gastrectomy. She has minimal to no GERD and her upper GI appears to be normal.  Originally she was thinking about gastric bypass but now is definitively wanting sleeve gastrectomy. She has completed the approval process and we will submit for insurance approval    Signed By: Socorro Young MD     January 28, 2021       Greater than half of the time: 30 minutes was used in counciling the patient about bariatric surgery and the steps she needs to take to move forward with her surgery. Ms. Jhonatan Dave has a reminder for a \"due or due soon\" health maintenance. I have asked that she contact her primary care provider for follow-up on this health maintenance.

## 2021-02-08 ENCOUNTER — PATIENT MESSAGE (OUTPATIENT)
Dept: OBGYN CLINIC | Age: 36
End: 2021-02-08

## 2021-02-11 ENCOUNTER — DOCUMENTATION ONLY (OUTPATIENT)
Dept: SURGERY | Age: 36
End: 2021-02-11

## 2021-02-18 ENCOUNTER — HOSPITAL ENCOUNTER (OUTPATIENT)
Dept: GENERAL RADIOLOGY | Age: 36
Discharge: HOME | End: 2021-02-18
Payer: COMMERCIAL

## 2021-02-18 ENCOUNTER — HOSPITAL ENCOUNTER (OUTPATIENT)
Dept: PREADMISSION TESTING | Age: 36
Discharge: HOME | End: 2021-02-18
Payer: COMMERCIAL

## 2021-02-18 VITALS
HEART RATE: 80 BPM | BODY MASS INDEX: 46.32 KG/M2 | HEIGHT: 65 IN | WEIGHT: 278 LBS | TEMPERATURE: 97.9 F | DIASTOLIC BLOOD PRESSURE: 79 MMHG | RESPIRATION RATE: 17 BRPM | SYSTOLIC BLOOD PRESSURE: 108 MMHG

## 2021-02-18 LAB
25(OH)D3 SERPL-MCNC: 22.4 NG/ML (ref 30–100)
ALBUMIN SERPL-MCNC: 3.5 G/DL (ref 3.5–5)
ALBUMIN/GLOB SERPL: 1.1 {RATIO} (ref 1.1–2.2)
ALP SERPL-CCNC: 54 U/L (ref 45–117)
ALT SERPL-CCNC: 27 U/L (ref 12–78)
ANION GAP SERPL CALC-SCNC: 5 MMOL/L (ref 5–15)
AST SERPL-CCNC: 14 U/L (ref 15–37)
ATRIAL RATE: 63 BPM
BASOPHILS # BLD: 0 K/UL (ref 0–0.1)
BASOPHILS NFR BLD: 1 % (ref 0–1)
BILIRUB SERPL-MCNC: 0.4 MG/DL (ref 0.2–1)
BUN SERPL-MCNC: 10 MG/DL (ref 6–20)
BUN/CREAT SERPL: 11 (ref 12–20)
CALCIUM SERPL-MCNC: 8.7 MG/DL (ref 8.5–10.1)
CALCULATED P AXIS, ECG09: 41 DEGREES
CALCULATED R AXIS, ECG10: 12 DEGREES
CALCULATED T AXIS, ECG11: 16 DEGREES
CHLORIDE SERPL-SCNC: 105 MMOL/L (ref 97–108)
CO2 SERPL-SCNC: 29 MMOL/L (ref 21–32)
CREAT SERPL-MCNC: 0.89 MG/DL (ref 0.55–1.02)
DIAGNOSIS, 93000: NORMAL
DIFFERENTIAL METHOD BLD: NORMAL
EOSINOPHIL # BLD: 0 K/UL (ref 0–0.4)
EOSINOPHIL NFR BLD: 1 % (ref 0–7)
ERYTHROCYTE [DISTWIDTH] IN BLOOD BY AUTOMATED COUNT: 12 % (ref 11.5–14.5)
EST. AVERAGE GLUCOSE BLD GHB EST-MCNC: 105 MG/DL
GLOBULIN SER CALC-MCNC: 3.2 G/DL (ref 2–4)
GLUCOSE SERPL-MCNC: 84 MG/DL (ref 65–100)
HBA1C MFR BLD: 5.3 % (ref 4–5.6)
HCT VFR BLD AUTO: 40.7 % (ref 35–47)
HGB BLD-MCNC: 14.2 G/DL (ref 11.5–16)
IMM GRANULOCYTES # BLD AUTO: 0 K/UL (ref 0–0.04)
IMM GRANULOCYTES NFR BLD AUTO: 0 % (ref 0–0.5)
IRON SERPL-MCNC: 52 UG/DL (ref 35–150)
LYMPHOCYTES # BLD: 2.6 K/UL (ref 0.8–3.5)
LYMPHOCYTES NFR BLD: 32 % (ref 12–49)
MCH RBC QN AUTO: 29.6 PG (ref 26–34)
MCHC RBC AUTO-ENTMCNC: 34.9 G/DL (ref 30–36.5)
MCV RBC AUTO: 84.8 FL (ref 80–99)
MONOCYTES # BLD: 0.4 K/UL (ref 0–1)
MONOCYTES NFR BLD: 5 % (ref 5–13)
NEUTS SEG # BLD: 5 K/UL (ref 1.8–8)
NEUTS SEG NFR BLD: 61 % (ref 32–75)
NRBC # BLD: 0 K/UL (ref 0–0.01)
NRBC BLD-RTO: 0 PER 100 WBC
P-R INTERVAL, ECG05: 172 MS
PLATELET # BLD AUTO: 244 K/UL (ref 150–400)
PMV BLD AUTO: 11.6 FL (ref 8.9–12.9)
POTASSIUM SERPL-SCNC: 3.9 MMOL/L (ref 3.5–5.1)
PROT SERPL-MCNC: 6.7 G/DL (ref 6.4–8.2)
Q-T INTERVAL, ECG07: 414 MS
QRS DURATION, ECG06: 90 MS
QTC CALCULATION (BEZET), ECG08: 423 MS
RBC # BLD AUTO: 4.8 M/UL (ref 3.8–5.2)
SODIUM SERPL-SCNC: 139 MMOL/L (ref 136–145)
TSH SERPL DL<=0.05 MIU/L-ACNC: 0.84 UIU/ML (ref 0.36–3.74)
VENTRICULAR RATE, ECG03: 63 BPM
WBC # BLD AUTO: 8.2 K/UL (ref 3.6–11)

## 2021-02-18 PROCEDURE — 84443 ASSAY THYROID STIM HORMONE: CPT

## 2021-02-18 PROCEDURE — 82306 VITAMIN D 25 HYDROXY: CPT

## 2021-02-18 PROCEDURE — 71046 X-RAY EXAM CHEST 2 VIEWS: CPT

## 2021-02-18 PROCEDURE — 80053 COMPREHEN METABOLIC PANEL: CPT

## 2021-02-18 PROCEDURE — 83540 ASSAY OF IRON: CPT

## 2021-02-18 PROCEDURE — 85025 COMPLETE CBC W/AUTO DIFF WBC: CPT

## 2021-02-18 PROCEDURE — 83036 HEMOGLOBIN GLYCOSYLATED A1C: CPT

## 2021-02-18 PROCEDURE — 93005 ELECTROCARDIOGRAM TRACING: CPT

## 2021-02-18 RX ORDER — BISMUTH SUBSALICYLATE 262 MG
1 TABLET,CHEWABLE ORAL DAILY
COMMUNITY

## 2021-02-18 NOTE — PERIOP NOTES
DO NOT EAT ANYTHING AFTER MIDNIGHT, except as instructed THE NIGHT BEFORE SURGERY.   PT GIVEN OPPORTUNITY TO ASK ADDITIONAL QUESTIONS.    Patient given two bottles CHG soap and instruction sheet, instructions for use reviewed with patient.    Patient given surgical site infection information FAQs handout and hand hygiene tips sheet.Pre-operative instructions reviewed and patient verbalizes understanding of instructions. Patient has been given the opportunity to ask additional questions.    INSTRUCTION SHEET FOR HOSPITAL APPOINTMENTS REVIEWED AND GIVEN TO PATIENT.   COVID-19 INSTRUCTION SHEET ALSO REVIEWED AND GIVEN TO PATIENT.

## 2021-02-23 ENCOUNTER — OFFICE VISIT (OUTPATIENT)
Dept: SURGERY | Age: 36
End: 2021-02-23
Payer: COMMERCIAL

## 2021-02-23 VITALS — HEIGHT: 65 IN | BODY MASS INDEX: 45.82 KG/M2 | WEIGHT: 275 LBS

## 2021-02-23 DIAGNOSIS — E28.2 PCOS (POLYCYSTIC OVARIAN SYNDROME): ICD-10-CM

## 2021-02-23 DIAGNOSIS — E66.01 MORBID OBESITY (HCC): Primary | ICD-10-CM

## 2021-02-23 DIAGNOSIS — G89.18 POST-OP PAIN: ICD-10-CM

## 2021-02-23 DIAGNOSIS — E55.9 VITAMIN D DEFICIENCY: ICD-10-CM

## 2021-02-23 PROCEDURE — 99024 POSTOP FOLLOW-UP VISIT: CPT | Performed by: NURSE PRACTITIONER

## 2021-02-23 RX ORDER — HYOSCYAMINE SULFATE 0.12 MG/1
0.12 TABLET SUBLINGUAL
Qty: 30 TAB | Refills: 0 | Status: SHIPPED | OUTPATIENT
Start: 2021-02-23 | End: 2022-10-26 | Stop reason: ALTCHOICE

## 2021-02-23 RX ORDER — OMEPRAZOLE 40 MG/1
40 CAPSULE, DELAYED RELEASE ORAL DAILY
Qty: 30 CAP | Refills: 1 | Status: SHIPPED | OUTPATIENT
Start: 2021-02-23 | End: 2021-03-26

## 2021-02-23 RX ORDER — ONDANSETRON 4 MG/1
4 TABLET, ORALLY DISINTEGRATING ORAL
Qty: 20 TAB | Refills: 0 | Status: SHIPPED | OUTPATIENT
Start: 2021-02-23

## 2021-02-23 RX ORDER — GABAPENTIN 100 MG/1
100-200 CAPSULE ORAL
Qty: 30 CAP | Refills: 0 | Status: SHIPPED | OUTPATIENT
Start: 2021-02-23 | End: 2021-02-28

## 2021-02-23 RX ORDER — ERGOCALCIFEROL 1.25 MG/1
50000 CAPSULE ORAL
Qty: 5 CAP | Refills: 2 | Status: SHIPPED | OUTPATIENT
Start: 2021-02-23 | End: 2021-07-12

## 2021-02-23 RX ORDER — POLYETHYLENE GLYCOL 3350 17 G/17G
17 POWDER, FOR SOLUTION ORAL DAILY
Qty: 90 PACKET | Refills: 3 | Status: SHIPPED | OUTPATIENT
Start: 2021-02-23

## 2021-02-23 NOTE — PROGRESS NOTES
1. Have you been to the ER, urgent care clinic since your last visit? Hospitalized since your last visit? no    2. Have you seen or consulted any other health care providers outside of the [address] [place] [address] since your last visit? Include any pap smears or colon screening.  no

## 2021-02-26 NOTE — PROGRESS NOTES
I was in the office while conducting this encounter. Consent:  She and/or her healthcare decision maker is aware that this patient-initiated Telehealth encounter is a billable service, with coverage as determined by her insurance carrier. She is aware that she may receive a bill and has provided verbal consent to proceed: No - Not billable    This virtual visit was conducted via [employer]. Pursuant to the emergency declaration under the [facility], [facility] waiver authority and the [employer] Act, this Virtual  Visit was conducted to reduce the patient's risk of exposure to COVID-19 and provide continuity of care for an established patient. Services were provided through a video synchronous discussion virtually to substitute for in-person clinic visit. Due to this being a TeleHealth evaluation, many elements of the physical examination are unable to be assessed. Total Time: minutes: 21-30 minutes. Chief Complaint   Patient presents with    Surg H&P     lap sleeve on 3/16/21, Dr. Sandro Gardiner, [phone]    Morbid Obesity    Weight Loss       [place] presents today for presurgical visit in preparation for weight loss surgery. [facility] has completed the pre-surgical requirements, classes and demonstrated readiness for surgery. Patient has had no recent illness, fevers or cough. [facility] has been in their usual state of health. Surgery Type:  Sleeve gastrectomy  Date of Surgery: 3/16/21  Surgeon:  Norma Torres MD     Pre Op diet:  Start 3/1/21    Preadmission testing reviewed face to face with [facility]. The following recommendations made based on findings:   Vit D deficiency:  Started on Ergocalciferol 50, 000 iu weekly     Body mass index is 45.76 kg/m². ICD-10-CM ICD-9-CM    1. Morbid obesity ([facility] [address]  E66.01 278.01    2. BMI 45.0-49.9, adult (HCC)  Z68.42 V85.42    3.  Post-op pain  G89.18 338.18 gabapentin (NEURONTIN) 100 mg capsule   4. Vitamin D deficiency  E55.9 268.9    5. PCOS (polycystic ovarian syndrome)  E28.2 256.4        Plan:  1. Morbid obesity:  Scheduled for Sleeve gastrectomy for treatment of morbid obesity  on 3/16/21 with Dr. Oleg Perez MD   ERAS protocol reviewed:  Acetaminophen 1000 mg at noon and 8 pm day before surgery and may have clear liquids (no caffeine, no ETOH, no carbonation and calorie free) until 3 hours prior to surgery   Preadmission testing results reviewed with patient   Post operative prescriptions sent to pharmacy on file for AFTER surgery:    Acid suppression  x 30 days, then reassess need Omeprazole 40 mg every day   Antiemetic  zofran 4 mg q 8 hours prn  Antispasmodic levsin 0.125 mg SL q 4 hours prn  Laxative:  Miralax 1 packet every day #90/3R   DVT prophylaxis: Early ambulation   Post op pain management:  Gabapentin 100-300 mg q 8 hours prn pain x 5 days; acetaminophen 1000 mg po q 8 hours prn; abdominal support / splinting; heating pad prn     Started on liver shrinking diet: 3/1/21  Reviewed and started Bariatric vitamins   Reviewed post operative diet, restrictions, follow up and medications  Post operative 2, 4 and 6  week appointments to be made  Reviewed educational materials and book    [address]   verbalized understanding and questions were answered to the best of my knowledge and ability. Pre and post op plan educational materials were provided.     30 minutes spent virtually  with patient

## 2021-03-02 ENCOUNTER — OFFICE VISIT (OUTPATIENT)
Dept: SURGERY | Age: 36
End: 2021-03-02
Payer: COMMERCIAL

## 2021-03-02 VITALS
RESPIRATION RATE: 18 BRPM | TEMPERATURE: 97.8 F | OXYGEN SATURATION: 97 % | SYSTOLIC BLOOD PRESSURE: 126 MMHG | HEIGHT: 65 IN | BODY MASS INDEX: 46.65 KG/M2 | HEART RATE: 84 BPM | WEIGHT: 280 LBS | DIASTOLIC BLOOD PRESSURE: 83 MMHG

## 2021-03-02 DIAGNOSIS — E66.01 MORBID OBESITY WITH BMI OF 45.0-49.9, ADULT (HCC): Primary | ICD-10-CM

## 2021-03-02 PROCEDURE — 99212 OFFICE O/P EST SF 10 MIN: CPT | Performed by: SURGERY

## 2021-03-02 NOTE — LETTER
3/2/2021 Patient: Li Duffy YOB: 1985 Date of Visit: 3/2/2021 MD Saranya Godinez [facility] [id number] Via Fax: [phone] Dear Dane Roach MD, Thank you for referring Ms. Kendra Bergman to [address] for evaluation. My notes for this consultation are attached. If you have questions, please do not hesitate to call me. I look forward to following your patient along with you. Sincerely, Evette Burr MD

## 2021-03-02 NOTE — PROGRESS NOTES
1. Have you been to the ER, urgent care clinic since your last visit? Hospitalized since your last visit? No    2. Have you seen or consulted any other health care providers outside of the [address] [place] [address] since your last visit? Include any pap smears or colon screening.  No

## 2021-03-02 NOTE — H&P (VIEW-ONLY)
[employer] [facility] Surgery History and Physical 
 
History of Present Illness:  
  
Radha Grimes is a 38 y.o. female who has been approved for laparoscopic sleeve gastrectomy. She has been through the necessary preoperative education and is now ready for surgery. She has been in good health recently. Past Medical History:  
Diagnosis Date  Chlamydia  Hirsutism  History of PCOS  Hypothyroidism  Joint pain   
 macarena. knees  Motion sickness  Pap smear for cervical cancer screening 1/24/12  
 negative  Pap smear for cervical cancer screening 6/26/14  
 negative hpv negative  Psychiatric disorder ANXIETY Past Surgical History:  
Procedure Laterality Date  HX DILATION AND CURETTAGE    
 HX HEENT    
 wisdom teeth removed Current Outpatient Medications:  
  ergocalciferol (ERGOCALCIFEROL) 1,250 mcg (50,000 unit) capsule, Take 1 Cap by mouth every seven (7) days. Indications: low vitamin D levels, Disp: 5 Cap, Rfl: 2 
  Lisdexamfetamine (Vyvanse) 70 mg cap, Take 70 mg by mouth daily. , Disp: , Rfl:  
  multivitamin (ONE A DAY) tablet, Take 1 Tab by mouth daily. , Disp: , Rfl:  
  levothyroxine (SYNTHROID) 88 mcg tablet, levothyroxine 88 mcg tablet  TAKE 1 TABLET BY MOUTH ONCE DAILY, Disp: , Rfl:  
  ondansetron (ZOFRAN ODT) 4 mg disintegrating tablet, Take 1 Tab by mouth every eight (8) hours as needed for Nausea or Nausea or Vomiting (AFTER SURGERY). , Disp: 20 Tab, Rfl: 0 
  polyethylene glycol (MIRALAX) 17 gram packet, Take 1 Packet by mouth daily. Indications: constipation, Disp: 90 Packet, Rfl: 3 
  hyoscyamine SL (Levsin/SL) 0.125 mg SL tablet, 1 Tab by SubLINGual route every four (4) hours as needed for Cramping (CHEST PRESSURE/ SPASM AFTER SURGERY). , Disp: 30 Tab, Rfl: 0 
  omeprazole (PRILOSEC) 40 mg capsule, Take 1 Cap by mouth daily. , Disp: 30 Cap, Rfl: 1 Allergies Allergen Reactions  Penicillins Hives  Percocet [Oxycodone-Acetaminophen] Hives Social History Socioeconomic History  Marital status: SINGLE Spouse name: Not on file  Number of children: Not on file  Years of education: Not on file  Highest education level: Not on file Occupational History  Occupation: HR  
  Comment: [employer] Social Needs  Financial resource strain: Not on file  Food insecurity Worry: Not on file Inability: Not on file  Transportation needs Medical: Not on file Non-medical: Not on file Tobacco Use  Smoking status: Never Smoker  Smokeless tobacco: Never Used Substance and Sexual Activity  Alcohol use: Yes Frequency: Monthly or less Comment: OCCASIONALLY  Drug use: No  
 Sexual activity: Yes  
  Partners: Male Birth control/protection: None Lifestyle  Physical activity Days per week: Not on file Minutes per session: Not on file  Stress: Not on file Relationships  Social connections Talks on phone: Not on file Gets together: Not on file Attends [employer] service: Not on file Active member of club or organization: Not on file Attends meetings of clubs or organizations: Not on file Relationship status: Not on file  Intimate partner violence Fear of current or ex partner: Not on file Emotionally abused: Not on file Physically abused: Not on file Forced sexual activity: Not on file Other Topics Concern  Not on file Social History Narrative In the home with partner Family History Problem Relation Age of Onset  Hypertension Mother  Coronary Artery Disease Mother  Osteoporosis Mother  Thyroid Disease Mother  Arthritis-rheumatoid Mother  No Known Problems Father  Osteoporosis Maternal Aunt  Breast Cancer Paternal Aunt  Breast Cancer Other  Anesth Problems Neg Hx   
 
 
ROS Constitutional: negative Ears, Nose, Mouth, Throat, and Face: negative Respiratory: negative Cardiovascular: negative Gastrointestinal: negative Genitourinary:negative Integument/Breast: negative Hematologic/Lymphatic: negative Behavioral/Psychiatric: negative Allergic/Immunologic: negative Physical Exam:  
 
Visit Vitals /83 (BP 1 Location: Right arm, BP Patient Position: Sitting, BP Cuff Size: Adult) Pulse 84 Temp 97.8 °F (36.6 °C) (Oral) Resp 18 Ht 5' 5\" (1.651 m) Wt 280 lb (127 kg) SpO2 97% BMI 46.59 kg/m² General - alert and oriented, no apparent distress HEENT - no jaundice, no hearing imparement Pulm - CTAB, no C/W/R 
CV - RRR, no M/R/G Abd -soft, nondistended, bowel sounds present, nontender to palpation Ext - pulses intact in UE and LE bilaterally, no edema Skin - supple, no rashes Psychiatric - normal affect, good mood Labs Lab Results Component Value Date/Time Sodium 139 02/18/2021 10:59 AM  
 Potassium 3.9 02/18/2021 10:59 AM  
 Chloride 105 02/18/2021 10:59 AM  
 CO2 29 02/18/2021 10:59 AM  
 Anion gap 5 02/18/2021 10:59 AM  
 Glucose 84 02/18/2021 10:59 AM  
 BUN 10 02/18/2021 10:59 AM  
 Creatinine 0.89 02/18/2021 10:59 AM  
 BUN/Creatinine ratio 11 (L) 02/18/2021 10:59 AM  
 GFR est AA >60 02/18/2021 10:59 AM  
 GFR est non-AA >60 02/18/2021 10:59 AM  
 Calcium 8.7 02/18/2021 10:59 AM  
 Bilirubin, total 0.4 02/18/2021 10:59 AM  
 Alk. phosphatase 54 02/18/2021 10:59 AM  
 Protein, total 6.7 02/18/2021 10:59 AM  
 Albumin 3.5 02/18/2021 10:59 AM  
 Globulin 3.2 02/18/2021 10:59 AM  
 A-G Ratio 1.1 02/18/2021 10:59 AM  
 ALT (SGPT) 27 02/18/2021 10:59 AM  
 AST (SGOT) 14 (L) 02/18/2021 10:59 AM  
 
Lab Results Component Value Date/Time WBC 8.2 02/18/2021 10:59 AM  
 HGB 14.2 02/18/2021 10:59 AM  
 HCT 40.7 02/18/2021 10:59 AM  
 PLATELET 212 59/22/7002 10:59 AM  
 MCV 84.8 02/18/2021 10:59 AM  
 
 
 
Imaging Chest x-ray-normal 
 
Upper GI-FINDINGS: 
 KUB is unremarkable.  This is a single contrast study. 
  
The esophagus shows normal contour, caliber, peristalsis and mucosa with no 
hiatal hernia or gastroesophageal reflux identified during this exam.  
  
Gastric emptying is prompt. Gastric peristalsis and mucosa are normal in 
appearance. The duodenal bulb shows no evidence of stricture or ulcer. The 
duodenal sweep and visualized portions of the proximal small bowel are normal. 
  
Air Kerma: 85 mGy. 
  
IMPRESSION IMPRESSION:  Normal Upper GI Exam. 
  
I have reviewed and agree with all of the pertinent images Assessment:  
 
Kristian French is a 38 y.o. female with morbid obesity BMI of 46.6 Recommendations:  
 
1. She is ready to proceed forward with laparoscopic sleeve gastrectomy with EGD. I have discussed the above procedure with the patient in detail. We reviewed the benefits and possible complications of the surgery which include bleeding, infection, damage to adjacent organs, venous thromboembolism, need for repeat surgery, death and other unforseen complications. The patient agreed to proceed with the surgery. Rizwan Muir MD 
 
Greater than half of the time: 20 minutes was used in counciling the patient about diagnosis and treatment plan Ms. Luna has a reminder for a \"due or due soon\" health maintenance. I have asked that she contact her primary care provider for follow-up on this health maintenance.

## 2021-03-02 NOTE — PROGRESS NOTES
3 [facility] at [facility] Surgery History and Physical    History of Present Illness:      Dipika Parrish is a 38 y.o. female who has been approved for laparoscopic sleeve gastrectomy. She has been through the necessary preoperative education and is now ready for surgery. She has been in good health recently. Past Medical History:   Diagnosis Date    Chlamydia     Hirsutism     History of PCOS     Hypothyroidism     Joint pain     macarena. knees    Motion sickness     Pap smear for cervical cancer screening 1/24/12    negative    Pap smear for cervical cancer screening 6/26/14    negative hpv negative    Psychiatric disorder     ANXIETY       Past Surgical History:   Procedure Laterality Date    HX DILATION AND CURETTAGE      HX HEENT      wisdom teeth removed         Current Outpatient Medications:     ergocalciferol (ERGOCALCIFEROL) 1,250 mcg (50,000 unit) capsule, Take 1 Cap by mouth every seven (7) days. Indications: low vitamin D levels, Disp: 5 Cap, Rfl: 2    Lisdexamfetamine (Vyvanse) 70 mg cap, Take 70 mg by mouth daily. , Disp: , Rfl:     multivitamin (ONE A DAY) tablet, Take 1 Tab by mouth daily. , Disp: , Rfl:     levothyroxine (SYNTHROID) 88 mcg tablet, levothyroxine 88 mcg tablet  TAKE 1 TABLET BY MOUTH ONCE DAILY, Disp: , Rfl:     ondansetron (ZOFRAN ODT) 4 mg disintegrating tablet, Take 1 Tab by mouth every eight (8) hours as needed for Nausea or Nausea or Vomiting (AFTER SURGERY). , Disp: 20 Tab, Rfl: 0    polyethylene glycol (MIRALAX) 17 gram packet, Take 1 Packet by mouth daily. Indications: constipation, Disp: 90 Packet, Rfl: 3    hyoscyamine SL (Levsin/SL) 0.125 mg SL tablet, 1 Tab by SubLINGual route every four (4) hours as needed for Cramping (CHEST PRESSURE/ SPASM AFTER SURGERY). , Disp: 30 Tab, Rfl: 0    omeprazole (PRILOSEC) 40 mg capsule, Take 1 Cap by mouth daily. , Disp: 30 Cap, Rfl: 1    Allergies   Allergen Reactions    Penicillins Hives    Percocet [Oxycodone-Acetaminophen] Hives       Social History     Socioeconomic History    Marital status: SINGLE     Spouse name: Not on file    Number of children: Not on file    Years of education: Not on file    Highest education level: Not on file   Occupational History    Occupation: HR     Comment: [employer]    Social Needs    Financial resource strain: Not on file    Food insecurity     Worry: Not on file     Inability: Not on file   [employer] needs     Medical: Not on file     Non-medical: Not on file   Tobacco Use    Smoking status: Never Smoker    Smokeless tobacco: Never Used   Substance and Sexual Activity    Alcohol use: Yes     Frequency: Monthly or less     Comment: OCCASIONALLY    Drug use: No    Sexual activity: Yes     Partners: Male     Birth control/protection: None   Lifestyle    Physical activity     Days per week: Not on file     Minutes per session: Not on file    Stress: Not on file   Relationships    Social connections     Talks on phone: Not on file     Gets together: Not on file     Attends Advent service: Not on file     Active member of club or organization: Not on file     Attends meetings of clubs or organizations: Not on file     Relationship status: Not on file    Intimate partner violence     Fear of current or ex partner: Not on file     Emotionally abused: Not on file     Physically abused: Not on file     Forced sexual activity: Not on file   Other Topics Concern    Not on file   Social History Narrative    In the home with partner        Family History   Problem Relation Age of Onset    Hypertension Mother     Coronary Artery Disease Mother     Osteoporosis Mother     Thyroid Disease Mother     Arthritis-rheumatoid Mother     No Known Problems Father     Osteoporosis Maternal Aunt     Breast Cancer Paternal Aunt     Breast Cancer Other     Anesth Problems Neg Hx        ROS   Constitutional: negative  Ears, Nose, Mouth, Throat, and Face: negative  Respiratory: negative  Cardiovascular: negative  Gastrointestinal: negative  Genitourinary:negative  Integument/Breast: negative  Hematologic/Lymphatic: negative  Behavioral/Psychiatric: negative  Allergic/Immunologic: negative      Physical Exam:     Visit Vitals  /83 (BP 1 Location: Right arm, BP Patient Position: Sitting, BP Cuff Size: Adult)   Pulse 84   Temp 97.8 °F (36.6 °C) (Oral)   Resp 18   Ht 5' 5\" (1.651 m)   Wt 280 lb (127 kg)   SpO2 97%   BMI 46.59 kg/m²       General - alert and oriented, no apparent distress  HEENT - no jaundice, no hearing imparement  Pulm - CTAB, no C/W/R  CV - RRR, no M/R/G  Abd -soft, nondistended, bowel sounds present, nontender to palpation  Ext - pulses intact in UE and LE bilaterally, no edema  Skin - supple, no rashes  Psychiatric - normal affect, good mood    Labs  Lab Results   Component Value Date/Time    Sodium 139 02/18/2021 10:59 AM    Potassium 3.9 02/18/2021 10:59 AM    Chloride 105 02/18/2021 10:59 AM    CO2 29 02/18/2021 10:59 AM    Anion gap 5 02/18/2021 10:59 AM    Glucose 84 02/18/2021 10:59 AM    BUN 10 02/18/2021 10:59 AM    Creatinine 0.89 02/18/2021 10:59 AM    BUN/Creatinine ratio 11 (L) 02/18/2021 10:59 AM    GFR est AA >60 02/18/2021 10:59 AM    GFR est non-AA >60 02/18/2021 10:59 AM    Calcium 8.7 02/18/2021 10:59 AM    Bilirubin, total 0.4 02/18/2021 10:59 AM    Alk. phosphatase 54 02/18/2021 10:59 AM    Protein, total 6.7 02/18/2021 10:59 AM    Albumin 3.5 02/18/2021 10:59 AM    Globulin 3.2 02/18/2021 10:59 AM    A-G Ratio 1.1 02/18/2021 10:59 AM    ALT (SGPT) 27 02/18/2021 10:59 AM    AST (SGOT) 14 (L) 02/18/2021 10:59 AM     Lab Results   Component Value Date/Time    WBC 8.2 02/18/2021 10:59 AM    HGB 14.2 02/18/2021 10:59 AM    HCT 40.7 02/18/2021 10:59 AM    PLATELET 602 83/68/5679 10:59 AM    MCV 84.8 02/18/2021 10:59 AM         Imaging  Chest x-ray-normal    Upper GI-FINDINGS:   KUB is unremarkable.  This is a single contrast study.     The esophagus shows normal contour, caliber, peristalsis and mucosa with no  hiatal hernia or gastroesophageal reflux identified during this exam.      Gastric emptying is prompt. Gastric peristalsis and mucosa are normal in  appearance. The duodenal bulb shows no evidence of stricture or ulcer. The  duodenal sweep and visualized portions of the proximal small bowel are normal.     Air Kerma: 85 mGy.     IMPRESSION  IMPRESSION:  Normal Upper GI Exam.     I have reviewed and agree with all of the pertinent images    Assessment:     Radha Grimes is a 38 y.o. female with morbid obesity BMI of 46.6    Recommendations:     1. She is ready to proceed forward with laparoscopic sleeve gastrectomy with EGD. I have discussed the above procedure with the patient in detail. We reviewed the benefits and possible complications of the surgery which include bleeding, infection, damage to adjacent organs, venous thromboembolism, need for repeat surgery, death and other unforseen complications. The patient agreed to proceed with the surgery. Tamara Granados MD    Greater than half of the time: 20 minutes was used in counciling the patient about diagnosis and treatment plan    Ms. Luna has a reminder for a \"due or due soon\" health maintenance. I have asked that she contact her primary care provider for follow-up on this health maintenance.

## 2021-03-12 ENCOUNTER — TRANSCRIBE ORDER (OUTPATIENT)
Dept: REGISTRATION | Age: 36
End: 2021-03-12

## 2021-03-12 ENCOUNTER — HOSPITAL ENCOUNTER (OUTPATIENT)
Dept: PREADMISSION TESTING | Age: 36
Discharge: HOME | End: 2021-03-12
Payer: COMMERCIAL

## 2021-03-12 DIAGNOSIS — Z01.812 PRE-PROCEDURE LAB EXAM: ICD-10-CM

## 2021-03-12 DIAGNOSIS — Z01.812 PRE-PROCEDURE LAB EXAM: Primary | ICD-10-CM

## 2021-03-12 PROCEDURE — U0003 INFECTIOUS AGENT DETECTION BY NUCLEIC ACID (DNA OR RNA); SEVERE ACUTE RESPIRATORY SYNDROME CORONAVIRUS 2 (SARS-COV-2) (CORONAVIRUS DISEASE [COVID-19]), AMPLIFIED PROBE TECHNIQUE, MAKING USE OF HIGH THROUGHPUT TECHNOLOGIES AS DESCRIBED BY CMS-2020-01-R: HCPCS

## 2021-03-13 LAB — SARS-COV-2, COV2NT: NOT DETECTED

## 2021-03-15 ENCOUNTER — ANESTHESIA EVENT (OUTPATIENT)
Dept: SURGERY | Age: 36
DRG: 621 | End: 2021-03-15
Payer: COMMERCIAL

## 2021-03-16 ENCOUNTER — HOSPITAL ENCOUNTER (INPATIENT)
Age: 36
LOS: 1 days | Discharge: HOME | DRG: 621 | End: 2021-03-17
Attending: SURGERY | Admitting: SURGERY
Payer: COMMERCIAL

## 2021-03-16 ENCOUNTER — ANESTHESIA (OUTPATIENT)
Dept: SURGERY | Age: 36
DRG: 621 | End: 2021-03-16
Payer: COMMERCIAL

## 2021-03-16 DIAGNOSIS — E66.01 MORBID OBESITY WITH BMI OF 45.0-49.9, ADULT (HCC): ICD-10-CM

## 2021-03-16 LAB — HCG UR QL: NEGATIVE

## 2021-03-16 PROCEDURE — 76010000131 HC OR TIME 2 TO 2.5 HR: Performed by: SURGERY

## 2021-03-16 PROCEDURE — 77030008684 HC TU ET CUF COVD -B: Performed by: ANESTHESIOLOGY

## 2021-03-16 PROCEDURE — 81025 URINE PREGNANCY TEST: CPT

## 2021-03-16 PROCEDURE — 76060000035 HC ANESTHESIA 2 TO 2.5 HR: Performed by: SURGERY

## 2021-03-16 PROCEDURE — 77030037032 HC INSRT SCIS CLICKLLINE DISP STOR -B: Performed by: SURGERY

## 2021-03-16 PROCEDURE — 77030040361 HC SLV COMPR DVT MDII -B: Performed by: SURGERY

## 2021-03-16 PROCEDURE — 77030002933 HC SUT MCRYL J&J -A: Performed by: SURGERY

## 2021-03-16 PROCEDURE — 77030037368 HC STPLR ENDO TRI-STPLR COVD -E: Performed by: SURGERY

## 2021-03-16 PROCEDURE — 0DB64Z3 EXCISION OF STOMACH, PERCUTANEOUS ENDOSCOPIC APPROACH, VERTICAL: ICD-10-PCS | Performed by: SURGERY

## 2021-03-16 PROCEDURE — 0DJ08ZZ INSPECTION OF UPPER INTESTINAL TRACT, VIA NATURAL OR ARTIFICIAL OPENING ENDOSCOPIC: ICD-10-PCS | Performed by: SURGERY

## 2021-03-16 PROCEDURE — 77030031139 HC SUT VCRL2 J&J -A: Performed by: SURGERY

## 2021-03-16 PROCEDURE — 77030038157 HC DEV PWR CNTR DISP SIGNIA COVD -C: Performed by: SURGERY

## 2021-03-16 PROCEDURE — 74011250636 HC RX REV CODE- 250/636: Performed by: SURGERY

## 2021-03-16 PROCEDURE — 74011000258 HC RX REV CODE- 258: Performed by: NURSE ANESTHETIST, CERTIFIED REGISTERED

## 2021-03-16 PROCEDURE — 74011250637 HC RX REV CODE- 250/637: Performed by: SURGERY

## 2021-03-16 PROCEDURE — 77030008756 HC TU IRR SUC STRY -B: Performed by: SURGERY

## 2021-03-16 PROCEDURE — 77030014090 HC TRCR OPT AMR -B: Performed by: SURGERY

## 2021-03-16 PROCEDURE — 77030002895 HC DEV VASC CLOSR COVD -B: Performed by: SURGERY

## 2021-03-16 PROCEDURE — 77030010507 HC ADH SKN DERMBND J&J -B: Performed by: SURGERY

## 2021-03-16 PROCEDURE — 74011250636 HC RX REV CODE- 250/636: Performed by: ANESTHESIOLOGY

## 2021-03-16 PROCEDURE — 88307 TISSUE EXAM BY PATHOLOGIST: CPT

## 2021-03-16 PROCEDURE — 2709999900 HC NON-CHARGEABLE SUPPLY

## 2021-03-16 PROCEDURE — 74011000250 HC RX REV CODE- 250: Performed by: SURGERY

## 2021-03-16 PROCEDURE — 74011000250 HC RX REV CODE- 250: Performed by: NURSE ANESTHETIST, CERTIFIED REGISTERED

## 2021-03-16 PROCEDURE — 65660000000 HC RM CCU STEPDOWN

## 2021-03-16 PROCEDURE — 77030022704 HC SUT VLOC COVD -B: Performed by: SURGERY

## 2021-03-16 PROCEDURE — 74011250636 HC RX REV CODE- 250/636: Performed by: NURSE ANESTHETIST, CERTIFIED REGISTERED

## 2021-03-16 PROCEDURE — 77030040922 HC BLNKT HYPOTHRM STRY -A

## 2021-03-16 PROCEDURE — 77030040956 HC DSCTR SONICISION MEDT -I: Performed by: SURGERY

## 2021-03-16 PROCEDURE — 77030016151 HC PROTCTR LNS DFOG COVD -B: Performed by: SURGERY

## 2021-03-16 PROCEDURE — 43775 LAP SLEEVE GASTRECTOMY: CPT | Performed by: SURGERY

## 2021-03-16 PROCEDURE — 76210000016 HC OR PH I REC 1 TO 1.5 HR: Performed by: SURGERY

## 2021-03-16 PROCEDURE — 77030020263 HC SOL INJ SOD CL0.9% LFCR 1000ML: Performed by: SURGERY

## 2021-03-16 PROCEDURE — 77030020829: Performed by: SURGERY

## 2021-03-16 PROCEDURE — 2709999900 HC NON-CHARGEABLE SUPPLY: Performed by: SURGERY

## 2021-03-16 PROCEDURE — 77030034208 HC SLV GASTRCTMY 3D CAL SYS DISP BOEH -C: Performed by: SURGERY

## 2021-03-16 PROCEDURE — 77030012770 HC TRCR OPT FX AMR -B: Performed by: SURGERY

## 2021-03-16 PROCEDURE — 77030037367 HC STPLR ENDO TRI-STPLR COVD -D: Performed by: SURGERY

## 2021-03-16 RX ORDER — MAGNESIUM SULFATE HEPTAHYDRATE 40 MG/ML
INJECTION, SOLUTION INTRAVENOUS AS NEEDED
Status: DISCONTINUED | OUTPATIENT
Start: 2021-03-16 | End: 2021-03-16 | Stop reason: HOSPADM

## 2021-03-16 RX ORDER — SUCCINYLCHOLINE CHLORIDE 20 MG/ML
INJECTION INTRAMUSCULAR; INTRAVENOUS AS NEEDED
Status: DISCONTINUED | OUTPATIENT
Start: 2021-03-16 | End: 2021-03-16 | Stop reason: HOSPADM

## 2021-03-16 RX ORDER — SODIUM CHLORIDE 0.9 % (FLUSH) 0.9 %
5-40 SYRINGE (ML) INJECTION AS NEEDED
Status: DISCONTINUED | OUTPATIENT
Start: 2021-03-16 | End: 2021-03-17 | Stop reason: HOSPADM

## 2021-03-16 RX ORDER — LIDOCAINE HYDROCHLORIDE ANHYDROUS AND DEXTROSE MONOHYDRATE .8; 5 G/100ML; G/100ML
INJECTION, SOLUTION INTRAVENOUS
Status: DISCONTINUED | OUTPATIENT
Start: 2021-03-16 | End: 2021-03-16 | Stop reason: HOSPADM

## 2021-03-16 RX ORDER — DEXAMETHASONE SODIUM PHOSPHATE 4 MG/ML
INJECTION, SOLUTION INTRA-ARTICULAR; INTRALESIONAL; INTRAMUSCULAR; INTRAVENOUS; SOFT TISSUE AS NEEDED
Status: DISCONTINUED | OUTPATIENT
Start: 2021-03-16 | End: 2021-03-16 | Stop reason: HOSPADM

## 2021-03-16 RX ORDER — ONDANSETRON 2 MG/ML
4 INJECTION INTRAMUSCULAR; INTRAVENOUS AS NEEDED
Status: DISCONTINUED | OUTPATIENT
Start: 2021-03-16 | End: 2021-03-16 | Stop reason: HOSPADM

## 2021-03-16 RX ORDER — SODIUM CHLORIDE 0.9 % (FLUSH) 0.9 %
5-40 SYRINGE (ML) INJECTION AS NEEDED
Status: DISCONTINUED | OUTPATIENT
Start: 2021-03-16 | End: 2021-03-16 | Stop reason: HOSPADM

## 2021-03-16 RX ORDER — GABAPENTIN 100 MG/1
200 CAPSULE ORAL 2 TIMES DAILY
Status: DISCONTINUED | OUTPATIENT
Start: 2021-03-16 | End: 2021-03-17 | Stop reason: HOSPADM

## 2021-03-16 RX ORDER — LIDOCAINE HYDROCHLORIDE 20 MG/ML
INJECTION, SOLUTION EPIDURAL; INFILTRATION; INTRACAUDAL; PERINEURAL AS NEEDED
Status: DISCONTINUED | OUTPATIENT
Start: 2021-03-16 | End: 2021-03-16 | Stop reason: HOSPADM

## 2021-03-16 RX ORDER — GABAPENTIN 250 MG/5ML
500 SOLUTION ORAL ONCE
Status: COMPLETED | OUTPATIENT
Start: 2021-03-16 | End: 2021-03-16

## 2021-03-16 RX ORDER — MIDAZOLAM HYDROCHLORIDE 1 MG/ML
1 INJECTION, SOLUTION INTRAMUSCULAR; INTRAVENOUS AS NEEDED
Status: DISCONTINUED | OUTPATIENT
Start: 2021-03-16 | End: 2021-03-16 | Stop reason: HOSPADM

## 2021-03-16 RX ORDER — SODIUM CHLORIDE 0.9 % (FLUSH) 0.9 %
5-40 SYRINGE (ML) INJECTION EVERY 8 HOURS
Status: DISCONTINUED | OUTPATIENT
Start: 2021-03-16 | End: 2021-03-17 | Stop reason: HOSPADM

## 2021-03-16 RX ORDER — PROPOFOL 10 MG/ML
INJECTION, EMULSION INTRAVENOUS AS NEEDED
Status: DISCONTINUED | OUTPATIENT
Start: 2021-03-16 | End: 2021-03-16 | Stop reason: HOSPADM

## 2021-03-16 RX ORDER — DIPHENHYDRAMINE HYDROCHLORIDE 50 MG/ML
12.5 INJECTION, SOLUTION INTRAMUSCULAR; INTRAVENOUS
Status: ACTIVE | OUTPATIENT
Start: 2021-03-16 | End: 2021-03-17

## 2021-03-16 RX ORDER — PANTOPRAZOLE SODIUM 40 MG/1
40 TABLET, DELAYED RELEASE ORAL
Status: DISCONTINUED | OUTPATIENT
Start: 2021-03-17 | End: 2021-03-17 | Stop reason: HOSPADM

## 2021-03-16 RX ORDER — HYDROMORPHONE HYDROCHLORIDE 1 MG/ML
0.5 INJECTION, SOLUTION INTRAMUSCULAR; INTRAVENOUS; SUBCUTANEOUS
Status: DISCONTINUED | OUTPATIENT
Start: 2021-03-16 | End: 2021-03-17 | Stop reason: HOSPADM

## 2021-03-16 RX ORDER — HYOSCYAMINE SULFATE 0.12 MG/1
0.12 TABLET SUBLINGUAL
Status: DISCONTINUED | OUTPATIENT
Start: 2021-03-16 | End: 2021-03-17 | Stop reason: HOSPADM

## 2021-03-16 RX ORDER — FENTANYL CITRATE 50 UG/ML
INJECTION, SOLUTION INTRAMUSCULAR; INTRAVENOUS AS NEEDED
Status: DISCONTINUED | OUTPATIENT
Start: 2021-03-16 | End: 2021-03-16 | Stop reason: HOSPADM

## 2021-03-16 RX ORDER — FENTANYL CITRATE 50 UG/ML
25 INJECTION, SOLUTION INTRAMUSCULAR; INTRAVENOUS
Status: COMPLETED | OUTPATIENT
Start: 2021-03-16 | End: 2021-03-16

## 2021-03-16 RX ORDER — ONDANSETRON 2 MG/ML
4 INJECTION INTRAMUSCULAR; INTRAVENOUS
Status: DISCONTINUED | OUTPATIENT
Start: 2021-03-16 | End: 2021-03-17 | Stop reason: HOSPADM

## 2021-03-16 RX ORDER — SCOLOPAMINE TRANSDERMAL SYSTEM 1 MG/1
1 PATCH, EXTENDED RELEASE TRANSDERMAL ONCE
Status: DISCONTINUED | OUTPATIENT
Start: 2021-03-16 | End: 2021-03-17 | Stop reason: HOSPADM

## 2021-03-16 RX ORDER — ROCURONIUM BROMIDE 10 MG/ML
INJECTION, SOLUTION INTRAVENOUS AS NEEDED
Status: DISCONTINUED | OUTPATIENT
Start: 2021-03-16 | End: 2021-03-16 | Stop reason: HOSPADM

## 2021-03-16 RX ORDER — KETOROLAC TROMETHAMINE 30 MG/ML
15 INJECTION, SOLUTION INTRAMUSCULAR; INTRAVENOUS EVERY 6 HOURS
Status: DISCONTINUED | OUTPATIENT
Start: 2021-03-16 | End: 2021-03-17 | Stop reason: HOSPADM

## 2021-03-16 RX ORDER — BUPIVACAINE HYDROCHLORIDE AND EPINEPHRINE 5; 5 MG/ML; UG/ML
INJECTION, SOLUTION EPIDURAL; INTRACAUDAL; PERINEURAL AS NEEDED
Status: DISCONTINUED | OUTPATIENT
Start: 2021-03-16 | End: 2021-03-16 | Stop reason: HOSPADM

## 2021-03-16 RX ORDER — ONDANSETRON 2 MG/ML
INJECTION INTRAMUSCULAR; INTRAVENOUS AS NEEDED
Status: DISCONTINUED | OUTPATIENT
Start: 2021-03-16 | End: 2021-03-16 | Stop reason: HOSPADM

## 2021-03-16 RX ORDER — HYDROMORPHONE HYDROCHLORIDE 1 MG/ML
1 INJECTION, SOLUTION INTRAMUSCULAR; INTRAVENOUS; SUBCUTANEOUS
Status: DISCONTINUED | OUTPATIENT
Start: 2021-03-16 | End: 2021-03-17 | Stop reason: HOSPADM

## 2021-03-16 RX ORDER — ENOXAPARIN SODIUM 100 MG/ML
40 INJECTION SUBCUTANEOUS EVERY 24 HOURS
Status: DISCONTINUED | OUTPATIENT
Start: 2021-03-17 | End: 2021-03-17 | Stop reason: HOSPADM

## 2021-03-16 RX ORDER — NALOXONE HYDROCHLORIDE 0.4 MG/ML
0.4 INJECTION, SOLUTION INTRAMUSCULAR; INTRAVENOUS; SUBCUTANEOUS AS NEEDED
Status: DISCONTINUED | OUTPATIENT
Start: 2021-03-16 | End: 2021-03-17 | Stop reason: HOSPADM

## 2021-03-16 RX ORDER — SODIUM CHLORIDE, SODIUM LACTATE, POTASSIUM CHLORIDE, CALCIUM CHLORIDE 600; 310; 30; 20 MG/100ML; MG/100ML; MG/100ML; MG/100ML
500 INJECTION, SOLUTION INTRAVENOUS CONTINUOUS
Status: DISCONTINUED | OUTPATIENT
Start: 2021-03-16 | End: 2021-03-16

## 2021-03-16 RX ORDER — ENOXAPARIN SODIUM 100 MG/ML
40 INJECTION SUBCUTANEOUS EVERY 24 HOURS
Status: DISCONTINUED | OUTPATIENT
Start: 2021-03-16 | End: 2021-03-16

## 2021-03-16 RX ORDER — MIDAZOLAM HYDROCHLORIDE 1 MG/ML
INJECTION, SOLUTION INTRAMUSCULAR; INTRAVENOUS AS NEEDED
Status: DISCONTINUED | OUTPATIENT
Start: 2021-03-16 | End: 2021-03-16 | Stop reason: HOSPADM

## 2021-03-16 RX ORDER — SODIUM CHLORIDE, SODIUM LACTATE, POTASSIUM CHLORIDE, CALCIUM CHLORIDE 600; 310; 30; 20 MG/100ML; MG/100ML; MG/100ML; MG/100ML
INJECTION, SOLUTION INTRAVENOUS
Status: DISCONTINUED | OUTPATIENT
Start: 2021-03-16 | End: 2021-03-16 | Stop reason: HOSPADM

## 2021-03-16 RX ORDER — LORAZEPAM 2 MG/ML
0.5 INJECTION INTRAMUSCULAR
Status: DISCONTINUED | OUTPATIENT
Start: 2021-03-16 | End: 2021-03-17 | Stop reason: HOSPADM

## 2021-03-16 RX ORDER — SODIUM CHLORIDE, SODIUM LACTATE, POTASSIUM CHLORIDE, CALCIUM CHLORIDE 600; 310; 30; 20 MG/100ML; MG/100ML; MG/100ML; MG/100ML
125 INJECTION, SOLUTION INTRAVENOUS CONTINUOUS
Status: DISCONTINUED | OUTPATIENT
Start: 2021-03-16 | End: 2021-03-17 | Stop reason: HOSPADM

## 2021-03-16 RX ORDER — ACETAMINOPHEN 500 MG
1000 TABLET ORAL EVERY 6 HOURS
Status: DISCONTINUED | OUTPATIENT
Start: 2021-03-16 | End: 2021-03-17 | Stop reason: HOSPADM

## 2021-03-16 RX ORDER — SODIUM CHLORIDE 0.9 % (FLUSH) 0.9 %
5-40 SYRINGE (ML) INJECTION EVERY 8 HOURS
Status: DISCONTINUED | OUTPATIENT
Start: 2021-03-16 | End: 2021-03-16 | Stop reason: HOSPADM

## 2021-03-16 RX ADMIN — KETOROLAC TROMETHAMINE 15 MG: 30 INJECTION, SOLUTION INTRAMUSCULAR at 23:19

## 2021-03-16 RX ADMIN — HYOSCYAMINE SULFATE 0.12 MG: 0.12 TABLET, ORALLY DISINTEGRATING SUBLINGUAL at 13:30

## 2021-03-16 RX ADMIN — FENTANYL CITRATE 25 MCG: 50 INJECTION, SOLUTION INTRAMUSCULAR; INTRAVENOUS at 13:00

## 2021-03-16 RX ADMIN — DEXMEDETOMIDINE HYDROCHLORIDE 5 MCG: 100 INJECTION, SOLUTION, CONCENTRATE INTRAVENOUS at 12:18

## 2021-03-16 RX ADMIN — MIDAZOLAM 2 MG: 1 INJECTION INTRAMUSCULAR; INTRAVENOUS at 10:18

## 2021-03-16 RX ADMIN — ROCURONIUM BROMIDE 15 MG: 10 SOLUTION INTRAVENOUS at 11:29

## 2021-03-16 RX ADMIN — KETOROLAC TROMETHAMINE 15 MG: 30 INJECTION, SOLUTION INTRAMUSCULAR at 19:14

## 2021-03-16 RX ADMIN — FENTANYL CITRATE 100 MCG: 50 INJECTION, SOLUTION INTRAMUSCULAR; INTRAVENOUS at 10:24

## 2021-03-16 RX ADMIN — PROPOFOL 30 MG: 10 INJECTION, EMULSION INTRAVENOUS at 10:18

## 2021-03-16 RX ADMIN — GABAPENTIN 500 MG: 250 SOLUTION ORAL at 08:53

## 2021-03-16 RX ADMIN — LORAZEPAM 0.5 MG: 2 INJECTION INTRAMUSCULAR; INTRAVENOUS at 13:35

## 2021-03-16 RX ADMIN — DEXMEDETOMIDINE HYDROCHLORIDE 5 MCG: 100 INJECTION, SOLUTION, CONCENTRATE INTRAVENOUS at 12:14

## 2021-03-16 RX ADMIN — HYOSCYAMINE SULFATE 0.12 MG: 0.12 TABLET, ORALLY DISINTEGRATING SUBLINGUAL at 21:05

## 2021-03-16 RX ADMIN — DEXMEDETOMIDINE HYDROCHLORIDE 5 MCG: 100 INJECTION, SOLUTION, CONCENTRATE INTRAVENOUS at 12:06

## 2021-03-16 RX ADMIN — ACETAMINOPHEN ORAL SOLUTION 1000 MG: 650 SOLUTION ORAL at 08:53

## 2021-03-16 RX ADMIN — ENOXAPARIN SODIUM 40 MG: 40 INJECTION SUBCUTANEOUS at 08:52

## 2021-03-16 RX ADMIN — ACETAMINOPHEN 1000 MG: 500 TABLET ORAL at 23:19

## 2021-03-16 RX ADMIN — MAGNESIUM SULFATE 2 G: 2 INJECTION INTRAVENOUS at 10:45

## 2021-03-16 RX ADMIN — SODIUM CHLORIDE, POTASSIUM CHLORIDE, SODIUM LACTATE AND CALCIUM CHLORIDE: 600; 310; 30; 20 INJECTION, SOLUTION INTRAVENOUS at 10:18

## 2021-03-16 RX ADMIN — SODIUM CHLORIDE, POTASSIUM CHLORIDE, SODIUM LACTATE AND CALCIUM CHLORIDE 125 ML/HR: 600; 310; 30; 20 INJECTION, SOLUTION INTRAVENOUS at 12:56

## 2021-03-16 RX ADMIN — ONDANSETRON 4 MG: 2 INJECTION INTRAMUSCULAR; INTRAVENOUS at 13:04

## 2021-03-16 RX ADMIN — DEXMEDETOMIDINE HYDROCHLORIDE 5 MCG: 100 INJECTION, SOLUTION, CONCENTRATE INTRAVENOUS at 12:11

## 2021-03-16 RX ADMIN — HYDROMORPHONE HYDROCHLORIDE 1 MG: 1 INJECTION, SOLUTION INTRAMUSCULAR; INTRAVENOUS; SUBCUTANEOUS at 16:47

## 2021-03-16 RX ADMIN — ROCURONIUM BROMIDE 45 MG: 10 SOLUTION INTRAVENOUS at 10:32

## 2021-03-16 RX ADMIN — LIDOCAINE HYDROCHLORIDE 100 MG: 20 INJECTION, SOLUTION EPIDURAL; INFILTRATION; INTRACAUDAL; PERINEURAL at 10:26

## 2021-03-16 RX ADMIN — Medication 10 ML: at 21:05

## 2021-03-16 RX ADMIN — SUCCINYLCHOLINE CHLORIDE 140 MG: 20 INJECTION, SOLUTION INTRAMUSCULAR; INTRAVENOUS at 10:27

## 2021-03-16 RX ADMIN — ONDANSETRON HYDROCHLORIDE 4 MG: 2 INJECTION, SOLUTION INTRAMUSCULAR; INTRAVENOUS at 12:11

## 2021-03-16 RX ADMIN — PROPOFOL 150 MG: 10 INJECTION, EMULSION INTRAVENOUS at 10:26

## 2021-03-16 RX ADMIN — PROCHLORPERAZINE EDISYLATE 5 MG: 5 INJECTION INTRAMUSCULAR; INTRAVENOUS at 15:20

## 2021-03-16 RX ADMIN — HYDROMORPHONE HYDROCHLORIDE 1 MG: 1 INJECTION, SOLUTION INTRAMUSCULAR; INTRAVENOUS; SUBCUTANEOUS at 21:05

## 2021-03-16 RX ADMIN — LIDOCAINE HYDROCHLORIDE 2 MG/KG/HR: 8 INJECTION, SOLUTION INTRAVENOUS at 10:29

## 2021-03-16 RX ADMIN — ROCURONIUM BROMIDE 5 MG: 10 SOLUTION INTRAVENOUS at 10:24

## 2021-03-16 RX ADMIN — DEXAMETHASONE SODIUM PHOSPHATE 6 MG: 4 INJECTION, SOLUTION INTRAMUSCULAR; INTRAVENOUS at 10:38

## 2021-03-16 RX ADMIN — MEPERIDINE HYDROCHLORIDE 25 MG: 50 INJECTION INTRAMUSCULAR; INTRAVENOUS; SUBCUTANEOUS at 12:42

## 2021-03-16 RX ADMIN — SODIUM CHLORIDE, POTASSIUM CHLORIDE, SODIUM LACTATE AND CALCIUM CHLORIDE 500 ML/HR: 600; 310; 30; 20 INJECTION, SOLUTION INTRAVENOUS at 09:14

## 2021-03-16 RX ADMIN — CEFAZOLIN SODIUM 3 G: 10 INJECTION, POWDER, FOR SOLUTION INTRAVENOUS at 10:35

## 2021-03-16 RX ADMIN — ROCURONIUM BROMIDE 5 MG: 10 SOLUTION INTRAVENOUS at 10:25

## 2021-03-16 RX ADMIN — DEXMEDETOMIDINE HYDROCHLORIDE 5 MCG: 100 INJECTION, SOLUTION, CONCENTRATE INTRAVENOUS at 12:08

## 2021-03-16 NOTE — OP NOTES
[address]   OPERATIVE REPORT    Name:  Shamar Maldonado  MR#:  [id number]  :  1985  ACCOUNT #:  [de-identified]  DATE OF SERVICE:  2021      PREOPERATIVE DIAGNOSIS:  Morbid obesity. POSTOPERATIVE DIAGNOSIS:  Morbid obesity. PROCEDURE PERFORMED:  Laparoscopic sleeve gastrectomy with esophagogastroduodenoscopy. SURGEON:  Floyd Primrose, MD    ASSISTANT:  Ivanna Pardo SA    ANESTHESIA:  General.    COMPLICATIONS:  None. SPECIMENS REMOVED:  Partial gastrectomy. IMPLANTS:  None. ESTIMATED BLOOD LOSS:  Less than 50 mL. DRAINS:  None. FINDINGS:  Normal sleeve gastrectomy over a 40-Nepali ViSiGi bougie, normal postoperative EGD, negative leak test.    INDICATIONS FOR OPERATION:  The patient is a 38-year-old female who has a history of morbid obesity with a BMI of 46.6 with bariatric comorbidities including PCOS and osteoarthritis, who has been preoperatively evaluated for bariatric surgery. She has been through the necessary preoperative education and workup for surgery and is now ready for surgery. DESCRIPTION OF OPERATION:  The patient was met in the preoperative holding area. The H and P was updated. Consent was signed. The risks and benefits were explained to the patient prior to the start of the operation. She was taken back to the operating room. She was lying in a supine position. The abdomen was prepped and draped in standard sterile fashion. Time-out was called. Antibiotics were given. SCDs were on lower extremities. Started the operation by making a 5 mm incision into the left upper quadrant, inserting a VisiPort trocar into intra-abdominal cavity, insufflating to 15 mmHg. We then placed a 5 mm trocar superior and to the left of the umbilicus, a 15 mm port superior and to the right of the umbilicus, and a 5 mm right upper quadrant. All the ports were placed under direct visualization. The trocars were in good position.   There were no injuries to the underlying abdominal structures. We then looked into the upper abdomen, finding the stomach to have a normal appearance. We placed a subxiphoid liver retractor lifting the liver up and out of the way. The hiatus appeared to be normal.  There was no sign of any hiatal hernia and again the stomach had a normal appearance. We then started taking down the gastrocolic ligament from the mid body of the stomach, traveling up superiorly along the greater curve of the stomach. There was a little bit of bleeding up around the area of the spleen. There did not appear to be any trauma to that area. We suctioned and irrigated that area out and there appeared to be no current bleeding going on. We then continued taking down the short gastric vessels from the greater curve of the stomach, traveling all the way up along the left molina, exposing the left molina and taking down the angle of His. All of the attachments of the stomach were taken down on the superior part of the stomach along the greater curve with the Sonicision device. Once all those were taken down, we then took down the remainder of the gastrocolic ligament all the way down to our georgi of about 6 cm proximal to pylorus which would be the starting point of our sleeve staple line. The stomach was now ready to have the ViSiGi tube passed down. It was passed down the mouth into the esophagus and down into the stomach. It was placed along the lesser curve all the way down to the antrum and pylorus area. It was then hooked to suction. The stomach was completely decompressed. We then started creating our vertical sleeve gastrectomy using a black load with TRS reinforcement for the first firing of our stapler. We made sure we stayed off of the incisura and started our staple line about 6 cm proximal to the pylorus. We then fired the stapler and then used a purple load with TRS reinforcement for the next firing.   Our next firing, we had a partial fire of about 1 cm with our purple load. The tissue appeared to be thick. We moved the ViSiGi bougie back and forth making sure that we were not anywhere close to the bougie, and we were not. It slid back and forth easily. We then used a black load with TRS reinforcement thinking that area might have been just too thick for the purple staple line, so then we used a black load with TRS reinforcement of the Signia stapler firing past that area along the outer edge of the bougie and we fired past the stapler and got past that area. There was no bleeding from the staple line. Everything looked good. We then used two more purple loads to finish off the rest of the partial gastrectomy specimen for sleeve gastrectomy, staying just off of the bougie and making a nice straight sleeve gastrectomy. Our partial gastrectomy specimen was divided and placed that into the right upper quadrant away from the sleeve staple line. We then had the ViSiGi tube removed from the stomach and passed off the field and then we inspected our stomach. All of our staple lines looked good. There was no bleeding from the staple lines and our staple lines had formed  correctly. There was no angulation, twisting, or bending of the sleeve. We then oversewed the sleeve staple line with a 2-0 absorbable V-Loc suture, buttressing with omentum laterally, keeping the stomach in a nice natural straight lie, coming down the stomach all the way down past the mid body of the stomach with the 2-0 absorbable V-Loc suture. We then performed our endoscopy with placing the scope down the mouth into the esophagus and down into the sleeve stomach which was nice and straight. There was no bleeding from the staple line.   There was no leak during our leak test.  We were able to get the scope all the way down to the area of the pylorus without any difficulties and then we desufflated the stomach, removing endoscope, passing it off the field and then we removed our partial gastrectomy specimen from the 15 mm port site after dilating with a fascial dilator. We passed our partial gastrectomy specimen off the field and then we closed our 15 mm port fascial defect with an Endo Close suture passing device in interrupted figure-of-eight fashion. We used one extra suture because there was a little bit of oozing from the stitch, which we oversewed with another Endo Close 0 Vicryl stitch and made sure that was hemostatic and it was. We looked back at our staple lines and everything was hemostatic. There was no bleeding. We then removed our subxiphoid liver retractor, desufflated the abdominal cavity, removed the trocars, and closed the skin with 4-0 Monocryl and Dermabond to complete the operation. Dr. Pablo Garcia was present and scrubbed during the entire operation. The counts were correct.         Harvey Gan MD      [id number]  D:  03/16/2021 12:17  T:  03/16/2021 14:32  JOB #:  [id number]

## 2021-03-16 NOTE — PERIOP NOTES
TRANSFER - OUT REPORT:    Verbal report given to BHARTI John (name) on Richa Luna  being transferred to  (unit) for routine post - op       Report consisted of patient’s Situation, Background, Assessment and   Recommendations(SBAR).     Time Pre op antibiotic given:1035  Anesthesia Stop time:  1246  Caraballo Present on Transfer to floor: No  Order for Caraballo on Chart: No  Discharge Prescriptions with Chart: N/A    Information from the following report(s) OR Summary, Intake/Output, MAR, Recent Results, Med Rec Status and Cardiac Rhythm SR was reviewed with the receiving nurse.    Opportunity for questions and clarification was provided.     Is the patient on 02? YES       L/Min 2    Is the patient on a monitor? NO    Is the nurse transporting with the patient? NO    Surgical Waiting Area notified of patient's transfer from PACU? YES      The following personal items collected during your admission accompanied patient upon transfer:   Dental Appliance:    Vision:    Hearing Aid:    Jewelry:    Clothing: Clothing: Other (comment)(clothing bag returned to patient in PACU)  Other Valuables: Other Valuables: Eyeglasses(returned to patient in PACU)  Valuables sent to safe:

## 2021-03-16 NOTE — PERIOP NOTES
Endoscope was pre-cleaned at bedside immediately following procedure by Arlene Vyas RN .   Button lot # [id number]

## 2021-03-16 NOTE — ROUTINE PROCESS
TRANSFER - IN REPORT:    Verbal report received from [address] [facility] [address] [facility] [address] (name) on Nallely Gilliland  being received from PACU (unit) for routine post - op      Report consisted of patients Situation, Background, Assessment and   Recommendations(SBAR). Information from the following report(s) SBAR, Kardex, Intake/Output, MAR and Recent Results was reviewed with the receiving nurse. Opportunity for questions and clarification was provided. Assessment completed upon patients arrival to unit and care assumed.

## 2021-03-16 NOTE — ANESTHESIA PREPROCEDURE EVALUATION
Relevant Problems   No relevant active problems       Anesthetic History   No history of anesthetic complications            Review of Systems / Medical History  Patient summary reviewed, nursing notes reviewed and pertinent labs reviewed    Pulmonary  Within defined limits                 Neuro/Psych   Within defined limits           Cardiovascular                  Exercise tolerance: >4 METS     GI/Hepatic/Renal                Endo/Other      Hypothyroidism  Morbid obesity     Other Findings              Physical Exam    Airway  Mallampati: I  TM Distance: > 6 cm  Neck ROM: normal range of motion   Mouth opening: Normal     Cardiovascular    Rhythm: regular  Rate: normal         Dental  No notable dental hx       Pulmonary  Breath sounds clear to auscultation               Abdominal         Other Findings            Anesthetic Plan    ASA: 3  Anesthesia type: general          Induction: Intravenous  Anesthetic plan and risks discussed with: Patient

## 2021-03-16 NOTE — BRIEF OP NOTE
Brief Postoperative Note    Patient: Radha Grimes  YOB: 1985  MRN: [id number]    Date of Procedure: 3/16/2021     Pre-Op Diagnosis: MORBID OBESITY    Post-Op Diagnosis: Same as preoperative diagnosis.       Procedure(s):  LAPAROSCOPIC SLEEVE GASTRECTOMY WITH EGD (E R A S)    Surgeon(s):  Katie Freitas MD    Surgical Assistant: Surg Asst-1: Kingsley Bermeo    Anesthesia: General     Estimated Blood Loss (mL): less than 50     Complications: None    Specimens:   ID Type Source Tests Collected by Time Destination   1 : Partial Gastrectomy Fresh   Katie Freitas MD 3/16/2021 1149 Pathology        Implants: * No implants in log *    Drains: * No LDAs found *    Findings: normal sleeve gastrectomy over a 40 Fr Visigi Bougie, normal post op EGD, negative leak test    Electronically Signed by Compa Du MD on 3/16/2021 at 12:06 PM

## 2021-03-16 NOTE — INTERVAL H&P NOTE
Update History & Physical      The surgery was reviewed with the patient and I examined the patient. There was no change. The surgical site was confirmed by the patient and me. Plan:  The risk, benefits, expected outcome, and alternative to the recommended procedure have been discussed with the patient. Patient understands and wants to proceed with the procedure.     Electronically signed by Nickie Santos MD on 3/16/2021 at 9:20 AM

## 2021-03-16 NOTE — ROUTINE PROCESS
Bedside shift change report given to [address] [place] [address] and Melani Paul RN (oncoming nurse) by Li Granados RN (offgoing nurse). Report included the following information SBAR, Kardex, Intake/Output, MAR and Recent Results.

## 2021-03-16 NOTE — PROGRESS NOTES
3:05 PM  Patient to the floor. Complaining of 11/10 pain in her abd. No N/V, SOB, chest pain, or numbness/tingling. Patient up to the restroom. Voided 300mL clear, yellow, urine. Patient began to dry-heave while on the toilet. Patient returned to bed. Patient given IV compazine for nausea. VSS. Primary [facility] performed a dual skin assessment on this patient No impairment noted  Andres score is 21. 4 abd lap sites with 1 port site - all with dermabond. 4PM  Patient resting in bed. 4:45 PM  Patient called out in 11/10 pain at this time. Patient medicated.  VSS

## 2021-03-17 VITALS
SYSTOLIC BLOOD PRESSURE: 124 MMHG | RESPIRATION RATE: 18 BRPM | TEMPERATURE: 98.1 F | HEIGHT: 65 IN | BODY MASS INDEX: 45.4 KG/M2 | HEART RATE: 77 BPM | WEIGHT: 272.49 LBS | DIASTOLIC BLOOD PRESSURE: 80 MMHG | OXYGEN SATURATION: 99 %

## 2021-03-17 LAB
ANION GAP SERPL CALC-SCNC: 7 MMOL/L (ref 5–15)
BUN SERPL-MCNC: 9 MG/DL (ref 6–20)
BUN/CREAT SERPL: 11 (ref 12–20)
CALCIUM SERPL-MCNC: 8.5 MG/DL (ref 8.5–10.1)
CHLORIDE SERPL-SCNC: 105 MMOL/L (ref 97–108)
CO2 SERPL-SCNC: 22 MMOL/L (ref 21–32)
CREAT SERPL-MCNC: 0.82 MG/DL (ref 0.55–1.02)
ERYTHROCYTE [DISTWIDTH] IN BLOOD BY AUTOMATED COUNT: 12.2 % (ref 11.5–14.5)
GLUCOSE SERPL-MCNC: 78 MG/DL (ref 65–100)
HCT VFR BLD AUTO: 37.5 % (ref 35–47)
HGB BLD-MCNC: 12.9 G/DL (ref 11.5–16)
MCH RBC QN AUTO: 29.6 PG (ref 26–34)
MCHC RBC AUTO-ENTMCNC: 34.4 G/DL (ref 30–36.5)
MCV RBC AUTO: 86 FL (ref 80–99)
NRBC # BLD: 0 K/UL (ref 0–0.01)
NRBC BLD-RTO: 0 PER 100 WBC
PLATELET # BLD AUTO: 234 K/UL (ref 150–400)
PMV BLD AUTO: 11.8 FL (ref 8.9–12.9)
POTASSIUM SERPL-SCNC: 4.1 MMOL/L (ref 3.5–5.1)
RBC # BLD AUTO: 4.36 M/UL (ref 3.8–5.2)
SODIUM SERPL-SCNC: 134 MMOL/L (ref 136–145)
WBC # BLD AUTO: 11.7 K/UL (ref 3.6–11)

## 2021-03-17 PROCEDURE — 85027 COMPLETE CBC AUTOMATED: CPT

## 2021-03-17 PROCEDURE — 74011250637 HC RX REV CODE- 250/637: Performed by: SURGERY

## 2021-03-17 PROCEDURE — 80048 BASIC METABOLIC PNL TOTAL CA: CPT

## 2021-03-17 PROCEDURE — 36415 COLL VENOUS BLD VENIPUNCTURE: CPT

## 2021-03-17 PROCEDURE — 74011250636 HC RX REV CODE- 250/636: Performed by: SURGERY

## 2021-03-17 RX ORDER — HYDROMORPHONE HYDROCHLORIDE 2 MG/1
4 TABLET ORAL
Status: DISCONTINUED | OUTPATIENT
Start: 2021-03-17 | End: 2021-03-17 | Stop reason: HOSPADM

## 2021-03-17 RX ORDER — HYDROMORPHONE HYDROCHLORIDE 2 MG/1
2 TABLET ORAL
Qty: 30 TAB | Refills: 0 | Status: SHIPPED | OUTPATIENT
Start: 2021-03-17 | End: 2021-03-24

## 2021-03-17 RX ADMIN — HYOSCYAMINE SULFATE 0.12 MG: 0.12 TABLET, ORALLY DISINTEGRATING SUBLINGUAL at 07:28

## 2021-03-17 RX ADMIN — SODIUM CHLORIDE, POTASSIUM CHLORIDE, SODIUM LACTATE AND CALCIUM CHLORIDE 125 ML/HR: 600; 310; 30; 20 INJECTION, SOLUTION INTRAVENOUS at 04:10

## 2021-03-17 RX ADMIN — HYDROMORPHONE HYDROCHLORIDE 4 MG: 2 TABLET ORAL at 18:37

## 2021-03-17 RX ADMIN — GABAPENTIN 200 MG: 100 CAPSULE ORAL at 18:37

## 2021-03-17 RX ADMIN — HYDROMORPHONE HYDROCHLORIDE 0.5 MG: 1 INJECTION, SOLUTION INTRAMUSCULAR; INTRAVENOUS; SUBCUTANEOUS at 07:28

## 2021-03-17 RX ADMIN — GABAPENTIN 200 MG: 100 CAPSULE ORAL at 09:48

## 2021-03-17 RX ADMIN — HYDROMORPHONE HYDROCHLORIDE 1 MG: 1 INJECTION, SOLUTION INTRAMUSCULAR; INTRAVENOUS; SUBCUTANEOUS at 04:43

## 2021-03-17 RX ADMIN — Medication 10 ML: at 06:33

## 2021-03-17 RX ADMIN — ACETAMINOPHEN 1000 MG: 500 TABLET ORAL at 06:31

## 2021-03-17 RX ADMIN — PANTOPRAZOLE SODIUM 40 MG: 40 TABLET, DELAYED RELEASE ORAL at 06:31

## 2021-03-17 RX ADMIN — KETOROLAC TROMETHAMINE 15 MG: 30 INJECTION, SOLUTION INTRAMUSCULAR at 06:31

## 2021-03-17 RX ADMIN — ACETAMINOPHEN 1000 MG: 500 TABLET ORAL at 13:08

## 2021-03-17 RX ADMIN — HYDROMORPHONE HYDROCHLORIDE 4 MG: 2 TABLET ORAL at 13:12

## 2021-03-17 RX ADMIN — KETOROLAC TROMETHAMINE 15 MG: 30 INJECTION, SOLUTION INTRAMUSCULAR at 13:08

## 2021-03-17 RX ADMIN — ENOXAPARIN SODIUM 40 MG: 40 INJECTION SUBCUTANEOUS at 09:48

## 2021-03-17 RX ADMIN — ACETAMINOPHEN 1000 MG: 500 TABLET ORAL at 18:37

## 2021-03-17 NOTE — PROGRESS NOTES
TRANSITIONS OF CARE PLAN:   1. RUR: 4%  2. DESTINATION: Home; medically stable   3. TRANSPORT: Cousin; The pt will coordinate transport  4. Discharge: Today; pending medical progression         Reason for Admission:  Laparoscopic sleeve gastrectomy                      RUR Score:         4%            Plan for utilizing home health:      No skilled needs at this time     PCP: First and Last name:  Jerry Ashley MD     Name of Practice:    Are you a current patient: Yes/No: Yes    Approximate date of last visit: Feb 2021   Can you participate in a virtual visit with your PCP: Yes                     Current Advanced Directive/Advance Care Plan: Prior      Healthcare Decision Maker:   Click here to complete [address] including selection of the Healthcare Decision Maker Relationship (ie \"Primary\")           Primary Decision Maker: Mother; Richie Howard [phone]                  Transition of Care Plan:            The pt presented as AOx4. The pt confirmed her demographics and insurance provider. The pt is currently employed by the [employer]. The pt's disposition is home, once medically stable. The pt stated that her cousin will provide transport home The pt does not have any pending consults for CM. The cm will continue to follow pending TERESA needs. Care Management Interventions  PCP Verified by CM: Yes(Last visit Feb of 2021)  Mode of Transport at Discharge:  Other (see comment)(Cousin )  Transition of Care Consult (CM Consult): Discharge Planning  MyChart Signup: Yes  Discharge Durable Medical Equipment: No  Physical Therapy Consult: No  Occupational Therapy Consult: No  Speech Therapy Consult: No  Confirm Follow Up Transport: Self  Discharge Location  Discharge Placement: Home  CM: [address]. [place],   [phone]

## 2021-03-17 NOTE — PROGRESS NOTES
Pt has taken in 5 rounds of liquids, had to take a short break earlier due to some nausea that quickly passed. Pt has been up in chair all day and has ambulated in halls with pCT, encourage pt to continue with liquids and to be up walking in hallway. Pt states that she has also walked several times in her room. 1900 -  Written and verbal instructions provided for pt for discharge. Pt is aware to  pain med at her pharmacy of choice. Pt has all other new medications at home already. Pt provided with one ounce cups for home use. Pt instructed to take her incentive spirometer home with her and to continue using it. Pt acknowledged and able to repeat back things that should be reported to her md and when her follow up visits are scheduled. Pt has all personal belongings at time of discharge. Pt had no questions or concerns voiced. Saline lock removed. Pt discharged from unit via wheelchair by PCT.

## 2021-03-17 NOTE — PROGRESS NOTES
[facility] at [facility]    POD #1    Subjective     No acute issues overnight, pain seems well controlled and not having much nausea, started bariatric liquids    Objective     Patient Vitals for the past 24 hrs:   Temp Pulse Resp BP SpO2   03/17/21 0820 98.5 °F (36.9 °C) 86 18 112/79 95 %   03/17/21 0416 98.5 °F (36.9 °C) 72 18 126/84 97 %   03/16/21 2327 97.5 °F (36.4 °C) 70 18 114/66 97 %   03/16/21 2130 98.2 °F (36.8 °C) 80 18 114/76 95 %   03/16/21 1642 97.8 °F (36.6 °C) 82 18 108/65 100 %   03/16/21 1504 97.5 °F (36.4 °C) 67 20 137/80 99 %   03/16/21 1430 -- 79 19 114/72 99 %   03/16/21 1415 -- 81 16 (!) 120/58 97 %   03/16/21 1400 -- 70 19 127/74 97 %   03/16/21 1345 -- 71 15 129/75 98 %   03/16/21 1330 97.4 °F (36.3 °C) 68 19 107/68 99 %   03/16/21 1315 -- 79 17 128/74 100 %   03/16/21 1300 -- 87 22 119/77 99 %   03/16/21 1255 -- 86 18 134/71 97 %   03/16/21 1250 -- (!) 103 21 134/79 97 %   03/16/21 1245 -- 83 18 122/75 100 %   03/16/21 1240 -- 87 19 112/69 100 %   03/16/21 1238 (!) 96.7 °F (35.9 °C) 91 19 (!) 111/57 99 %       Date 03/16/21 0700 - 03/17/21 0659 03/17/21 0700 - 03/18/21 0659   Shift 9419-1608 6104-1788 24 Hour Total 3919-8558 3439-3067 24 Hour Total   INTAKE   Shift Total(mL/kg)         OUTPUT   Urine(mL/kg/hr) 600(0.4) 400(0.3) 1000(0.3)        Urine Voided         Urine Occurrence(s)  1 x 1 x      Shift Total(mL/kg) 600(4.9) 400(3.2) 1000(8.1)      NET -600 -400 -1000      Weight (kg) 123.6 123.6 123.6 123.6 123.6 123.6       PE  Pulm - CTAB  CV - RRR  Abd -soft, nondistended, bowel sounds present, incisions clean dry intact    Labs  Recent Results (from the past 12 hour(s))   METABOLIC PANEL, BASIC    Collection Time: 03/17/21  4:53 AM   Result Value Ref Range    Sodium 134 (L) 136 - 145 mmol/L    Potassium 4.1 3.5 - 5.1 mmol/L    Chloride 105 97 - 108 mmol/L    CO2 22 21 - 32 mmol/L    Anion gap 7 5 - 15 mmol/L    Glucose 78 65 - 100 mg/dL    BUN 9 6 - 20 MG/DL    Creatinine 0.82 0.55 - 1.02 MG/DL    BUN/Creatinine ratio 11 (L) 12 - 20      GFR est AA >60 >60 ml/min/1.73m2    GFR est non-AA >60 >60 ml/min/1.73m2    Calcium 8.5 8.5 - 10.1 MG/DL   CBC W/O DIFF    Collection Time: 03/17/21  4:53 AM   Result Value Ref Range    WBC 11.7 (H) 3.6 - 11.0 K/uL    RBC 4.36 3.80 - 5.20 M/uL    HGB 12.9 11.5 - 16.0 g/dL    HCT 37.5 35.0 - 47.0 %    MCV 86.0 80.0 - 99.0 FL    MCH 29.6 26.0 - 34.0 PG    MCHC 34.4 30.0 - 36.5 g/dL    RDW 12.2 11.5 - 14.5 %    PLATELET 839 868 - 819 K/uL    MPV 11.8 8.9 - 12.9 FL    NRBC 0.0 0  WBC    ABSOLUTE NRBC 0.00 0.00 - 0.01 K/uL         Assessment     Rachid Luna is a 38 y. o.yr old female status post laparoscopic sleeve gastrectomy    Plan     She is doing well after surgery  Starting bariatric liquids today  Continue IV fluids  Lovenox to start this morning  P.o.  Dilaudid to start today  Up and out of bed walking more  Possible discharge home later today depending on how the fluids are going    Rosita Miller MD

## 2021-03-17 NOTE — PROGRESS NOTES
Bedside shift change report given to Roly Brooks RN (oncoming nurse) by Lestine Cushing, RN (offgoing nurse). Report included the following information SBAR, Kardex, Procedure Summary, Intake/Output, MAR and Recent Results.

## 2021-03-17 NOTE — PROGRESS NOTES
Problem: Gastric Sleeve Pathway / Bariatric Revision Pathway: Day of Surgery  Goal: Activity/Safety  Outcome: Progressing Towards Goal  Goal: Consults, if ordered  Outcome: Progressing Towards Goal  Goal: Diagnostic Test/Procedures  Outcome: Progressing Towards Goal  Goal: Nutrition/Diet  Outcome: Progressing Towards Goal  Goal: Medications  Outcome: Progressing Towards Goal  Goal: Respiratory  Outcome: Progressing Towards Goal  Goal: Treatments/Interventions/Procedures  Outcome: Progressing Towards Goal  Goal: Psychosocial  Outcome: Progressing Towards Goal  Goal: *No signs and symptoms of infection or wound complications  Outcome: Progressing Towards Goal  Goal: *Optimal pain control at patient's stated goal  Outcome: Progressing Towards Goal  Goal: *Adequate urinary output (equal to or greater than 30 milliliters/hour)  Outcome: Progressing Towards Goal  Goal: *Hemodynamically stable  Outcome: Progressing Towards Goal  Goal: *Tolerating diet  Outcome: Progressing Towards Goal  Goal: *Demonstrates progressive activity  Outcome: Progressing Towards Goal  Goal: *Absence of signs and symptoms of DVT  Outcome: Progressing Towards Goal  Goal: *Labs within defined limits  Outcome: Progressing Towards Goal  Goal: *Oxygen saturation within defined limits  Outcome: Progressing Towards Goal     Problem: Patient Education: Go to Patient Education Activity  Goal: Patient/Family Education  Outcome: Progressing Towards Goal

## 2021-03-17 NOTE — CONSULTS
NUTRITION     Chart reviewed. Post-op bariatric diet instruction completed. Will gladly follow up for additional questions as needed. Thank you.      Shruthi Perez MS, NDTR, Dietetic Intern    Contact: Leyla Carrillo RD via [address] [place] [address]

## 2021-03-17 NOTE — DISCHARGE INSTRUCTIONS
Laparoscopic Sleeve Gastrectomy    Patient Discharge Instructions    Awilda Conley / [id number] : 1985    Admitted 3/16/2021 Discharged:        · It is important that you take the medication exactly as they are prescribed. · Keep your medication in the bottles provided by the pharmacist and keep a list of the medication names, dosages, and times to be taken in your wallet. · Do not take other medications without consulting your doctor. · Wound care: You may shower starting tomorrow. Do not remove the dermabond on the incision, it will fall of on its own in a few weeks. · No heavy lifting or strenuous activity for 2wks after the operation    Please add a B12 500mcg supplement daily. Because part of your stomach has been removed, you are more at risk for a B12 deficiency. Diet:  Full liquid Bariatric diet as outlined in your education book and on the handout provided by the dietician. Gastric Sleeve  Patient Discharge Instructions  [address] [facility] at [facility]   [phone]    1. Activities: You may be active walking, stair climbing, and doing light weight activities with one to two-pound weights, sitting in a chair using different arm motions. Major restrictions include driving until your first office visit and lifting anything heavier than ten pounds. It is very important that you walk frequently. In addition to walking, you should continue to use your incentive spirometer (breathing exercises) throughout the day. 2. Caring for vour incision (s}: Your surgical incision(s) will be covered with Derma bond (super glue). You may shower as desired and simply pat dry the area over the incision(s). There may occasionally be seepage of yellowish to yellowish-maroon dissolved fat from your wound, which is of no major concern as long as the wound is not red, hardened in the area of the drainage, or if the drainage has a foul smell.  If there are any questions, call our office for further instructions. If there is this type of dissolved fat drainage,  the shower and gently express the fluid from your wound. Then keep gauze pads over the area to protect both the wound and your clothes. 3. When to call the office immediately:      *Chest pain (not associated with eating/drinking)  *Shortness of breath (more than normal)  *Sudden pain and/or redness in calf  *Fever greater than 101F  *Persistent nausea and/or vomiting (unable to keep down any liquids)  *Bleeding from incision(s)  *Severe abdominal pain  *Any other concerning symptoms    4. Diet: There are three main priorities as far as your diet is concerned---    a. Clear Liquids-drink from 1 oz. cups or standard shot glass. These liquids are non-carbonated, no added sugar, and not irritating to your stomach. They may include water, tea, coffee\" 100% no added sugar juices (avoid citrus) , clear broth, blenderized clear soups such as [employer]' Healthy Request Chicken Noodle and Chicken & Rice, Sugar-free products including popsicles, Ben-Aid, and Crystal Lite. b. Vitamins: Multi-vitamin with iron, Vitamin B-12 and D capsule may be taken as recommended. c. Protein Intake: During your initial two-three weeks when your body is switching from burning glucose to directly burning fat, there is an attempt by your body to use protein as a fuel. To protect that protein, we like you to exercise as listed above, and to try to take in 50-60 grams of protein per day. This can be done with four 8 oz. serving of skim milk and Low Carb [place] Instant Breakfast. Each 8 oz. should be considered a meal-breakfast, lunch, or supper, and during this mealtime it should be the sole ingested substance. Stop your clear liquids for 20 minutes before your start on the instant breakfast, which is sipped 1 oz. at a time.  You may also try the following substitute for [place] Instant Breakfast: skim milk-fat free powdered milk + Egg Beaters + flavor extract. If desired, ice may be added and blenderized in the . If the milk upsets your stomach, you may purchase Lactaid tablets from any drug store. Lactaid skim milk may be purchased at most major supermarkets. Another alternative is Boost Diabetic, which is Lactose-free and ready to drink. There are many protein supplements on the market. Whey and Soy based protein powders/drinks are acceptable. If you have any questions about specific products please call the office. d. Other foods may be taken if you have met the requirements of a, b, and c. These foods should be low fat, low sugar, and low spice, and blenderized to the point that, if needed, could be sucked through a straw. One of the favorite treats is dietetic canned fruit blenderized with a combination of its juices and crushed ice, and then eaten in small amounts with a spoon. A smooth low-calorie, low-fat yogurt (should be 100 calories or less) is acceptable as is low-fat or fat-free cottage cheese. If you are having difficulty with your diet, please call the office. 5. Medications: Take no more than 2 pills at a time. Wait 20 minutes between pills. a. Vitamins as listed above---multi-vitamin with iron twice a day, B-12 once a day, vitamin D.    b. Acid reducing medicine--Will be prescribed by your surgeon at discharge. c. Mylanta Plus--one to two teaspoons as needed for belching or gas (other gas relieving medicines are also acceptable Beano, GasX, etc). d. Bowel Regulation--mild laxatives are permissible such as Milk of Magnesia, Dulcolax (either by mouth or suppository). If you are accustomed to using a [id number] laxative not mentioned, you may continue to use it. Fibercon tablets or Benefiber may be taken as a fiber supplement to regulate bowel movements. Fibercon tablets should be broken in half and taken in half and taken one half in the morning and one half in the evening.   Benefiber (1 tsp.) can be added to your protein drink(s). It has no taste or added thickness. Imodium AD may be taken as needed for diarrhea.    e. Pain medication-one to two every four hours as needed for pain control. If pain is mild, try extra-strength Tylenol first.    f. Do not take any pain or arthritis medication such as Aspirin, Advil, Aleve, Naprosyn, or any other nonsteroidal anti-inflammatory medication unless approved by your surgeon. A Tylenol product is OK.    g. Preadmission medications may be resumed, but this should be discussed with your doctor before your discharge from the hospital.    6.  Follow up Office Visits:  call our office at [phone] if you have any questions or concerns. Your appointment should be 2 weeks from your surgery date. Do not plan on returning to work prior to this office visit unless discussed with your doctor. Information obtained by :  I understand that if any problems occur once I am at home I am to contact my physician. I understand and acknowledge receipt of the instructions indicated above.                                                                                                                                            Physician's or R.N.'s Signature                                                                  Date/Time                                                                                                                                              Patient or Representative Signature                                                          Date/Time         [place] MD Marlene Ojeda, [facility], [address] [facility]

## 2021-03-17 NOTE — DISCHARGE SUMMARY
Physician Discharge Summary     Patient ID:  George Neff  [id number]  54 y.o.  1985    Admit Date: 3/16/2021    Discharge Date:     Admission Diagnoses: Morbid obesity with BMI of 45.0-49.9, adult ([facility] [E66.01, Z68.42]    Discharge Diagnoses: Active Problems: Morbid obesity with BMI of 45.0-49.9, adult ([facility] (3/16/2021)         Admission Condition: Good    Discharge Condition: Good    Procedure(s):    3/16/2021 - Procedure(s):  LAPAROSCOPIC SLEEVE GASTRECTOMY WITH EGD (E R A S)      Hospital Course:   Normal hospital course for this procedure. She was doing well on afternoon of POD 1 and ready for DC home. Consults: none    Significant Diagnostic Studies: none    Disposition: home    Patient Instructions:   Current Discharge Medication List      START taking these medications    Details   HYDROmorphone (DILAUDID) 2 mg tablet Take 1 Tab by mouth every four (4) hours as needed for Pain for up to 7 days. Max Daily Amount: 12 mg.  Qty: 30 Tab, Refills: 0    Associated Diagnoses: Morbid obesity with BMI of 45.0-49.9, adult ([facility]         CONTINUE these medications which have NOT CHANGED    Details   ergocalciferol (ERGOCALCIFEROL) 1,250 mcg (50,000 unit) capsule Take 1 Cap by mouth every seven (7) days. Indications: low vitamin D levels  Qty: 5 Cap, Refills: 2      Lisdexamfetamine (Vyvanse) 70 mg cap Take 70 mg by mouth daily. multivitamin (ONE A DAY) tablet Take 1 Tab by mouth daily. levothyroxine (SYNTHROID) 88 mcg tablet levothyroxine 88 mcg tablet   TAKE 1 TABLET BY MOUTH ONCE DAILY      ondansetron (ZOFRAN ODT) 4 mg disintegrating tablet Take 1 Tab by mouth every eight (8) hours as needed for Nausea or Nausea or Vomiting (AFTER SURGERY). Qty: 20 Tab, Refills: 0      polyethylene glycol (MIRALAX) 17 gram packet Take 1 Packet by mouth daily.  Indications: constipation  Qty: 90 Packet, Refills: 3      hyoscyamine SL (Levsin/SL) 0.125 mg SL tablet 1 Tab by SubLINGual route every four (4) hours as needed for Cramping (CHEST PRESSURE/ SPASM AFTER SURGERY). Qty: 30 Tab, Refills: 0      omeprazole (PRILOSEC) 40 mg capsule Take 1 Cap by mouth daily.   Qty: 30 Cap, Refills: 1             Activity: No heavy lifting for 2 weeks  Diet: bariatric liquid diet  Wound Care: Keep wound clean and dry    Follow-up with Pawan Abel MD in 2 week(s)  Follow-up tests/labs none    Signed:  Pawan Abel MD  3/17/2021  5:18 PM   .

## 2021-03-17 NOTE — ANESTHESIA POSTPROCEDURE EVALUATION
Post-Anesthesia Evaluation and Assessment    Patient: Kym Momin MRN: [id number]  SSN: [id number]    YOB: 1985  Age: 38 y.o. Sex: female      I have evaluated the patient and they are stable and ready for discharge from the PACU. Cardiovascular Function/Vital Signs  Visit Vitals  /79 (BP 1 Location: Right arm, BP Patient Position: At rest)   Pulse 86   Temp 36.9 °C (98.5 °F)   Resp 18   Ht 5' 5\" (1.651 m)   Wt 123.6 kg (272 lb 7.8 oz)   SpO2 95%   BMI 45.34 kg/m²       Patient is status post General anesthesia for Procedure(s):  LAPAROSCOPIC SLEEVE GASTRECTOMY WITH EGD (E R A S). Nausea/Vomiting: None    Postoperative hydration reviewed and adequate. Pain:  Pain Scale 1: Numeric (0 - 10) (03/17/21 1314)  Pain Intensity 1: 9 (03/17/21 1314)   Managed    Neurological Status:   Neuro (WDL): Within Defined Limits (03/16/21 1330)   At baseline    Mental Status, Level of Consciousness: Alert and  oriented to person, place, and time    Pulmonary Status:   O2 Device: Nasal cannula (03/16/21 1506)   Adequate oxygenation and airway patent    Complications related to anesthesia: None    Post-anesthesia assessment completed. No concerns    Signed By: Lisa Dejesus MD     March 17, 2021              Procedure(s):  LAPAROSCOPIC SLEEVE GASTRECTOMY WITH EGD (E R A S). general    <BSHSIANPOST>    INITIAL Post-op Vital signs:   Vitals Value Taken Time   /72 03/16/21 1430   Temp 36.3 °C (97.4 °F) 03/16/21 1330   Pulse 79 03/16/21 1435   Resp 22 03/16/21 1435   SpO2 97 % 03/16/21 1435   Vitals shown include unvalidated device data.

## 2021-03-18 ENCOUNTER — TELEPHONE (OUTPATIENT)
Dept: SURGERY | Age: 36
End: 2021-03-18

## 2021-03-18 DIAGNOSIS — B37.31 YEAST VAGINITIS: Primary | ICD-10-CM

## 2021-03-18 RX ORDER — FLUCONAZOLE 150 MG/1
150 TABLET ORAL DAILY
Qty: 1 TAB | Refills: 0 | Status: SHIPPED | OUTPATIENT
Start: 2021-03-18 | End: 2021-03-19

## 2021-03-18 NOTE — TELEPHONE ENCOUNTER
Bariatric Post-Operative Phone Calls: 48 hour phone call    Diet:Question of any nausea and/or vomiting. Protein intake (goal is 60 grams of protein daily)   Poor____Fair__X__Good____Great____     Comment:_No N/V consumed 8 gms of protein this morning_____________________________________________________________      ______________________________________________________________________    Hydration:Less than 32 ounces of water daily is fair to poor (Goal is 64 ounces per day)   Poor____ Fair__X__ Good____Great____    Comment: consumed 8 oz water this morning about 20 oz yesterday ______________________________________________________________    ______________________________________________________________________      Ambulation:( walking at least 3 x week, for 15- 20 minutes)     Poor______ Fair_X_____ Good______     Great______ Comment:_walking about 15 min 3 times a day_________________________________________________    ______________________________________________________________________      Urine Color: Question of any odor and color(should be franchesca, pale, and clear) Dark______ Amber______ Pale______      Clear_X_____ Comment:_No problems urinating__________________________________________________                           ________________________________________________________________    Bowel movements: Question of any constipation- haven't had any bowel movements for more than 3 days. This could be related to protein intake and/or narcotic pain medication usage. Comment:                                                     Has not had a BM since the day prior to surgery. She has miralex has not taken yet. Encouraged to take the miralex daily if no BM                                                                         Pain: Left sided abdominal pain is normal (should be less than 3)  Question if pain medication is helpful.  10___ 9___ 8_X__ 7___ 6___ 5___ 4___ 3___ 2___1___0___Comment:C/O rt sided abdominal pain. She alternating the dilaudid with gabapentin. She is wearing a girldle which helps give support_________________________________________________    ______________________________________________________________________      Incision: (No redness, pain, swelling or fever) Healing Well__X____     Healed______Redness_________ Pain_________     Swelling_________ Fever__________(greater than 101 needs evaluation)    Comment:__No drainage__________________________________________________________    ______________________________________________________________________  Use of incentive spirometer: Yes_X___       No           Next Appointment:_3/30/21 at 10 AM_____________                 Support Group: Yes__X____No______    Additional Comments:_She states she _developed a yeast infection last night after taking dose of ABT in the hospital. She states she is proned to developing them. It usually clears after taking diflucan. She'd like a Rx for the diflucan sent to the pharmacy. __________________________________________________________    ____________________________________________________________________      If more than one parameter is not met or considered poor, nurse needs to discuss with provider recommend for patient to be seen in the office as soon as possible or refer to the provider for follow-up. Reinforce to patient to use bariatric educational booklet as guide. It is appropriate to refer patient to the nutritionist to discuss more in detail of diet and nutrition.

## 2021-03-26 RX ORDER — OMEPRAZOLE 40 MG/1
CAPSULE, DELAYED RELEASE ORAL
Qty: 30 CAP | Refills: 1 | Status: SHIPPED | OUTPATIENT
Start: 2021-03-26 | End: 2021-04-27

## 2021-03-30 ENCOUNTER — DOCUMENTATION ONLY (OUTPATIENT)
Dept: SURGERY | Age: 36
End: 2021-03-30

## 2021-03-30 ENCOUNTER — OFFICE VISIT (OUTPATIENT)
Dept: SURGERY | Age: 36
End: 2021-03-30
Payer: COMMERCIAL

## 2021-03-30 VITALS
WEIGHT: 255.2 LBS | SYSTOLIC BLOOD PRESSURE: 114 MMHG | HEART RATE: 81 BPM | DIASTOLIC BLOOD PRESSURE: 80 MMHG | TEMPERATURE: 98.6 F | RESPIRATION RATE: 18 BRPM | BODY MASS INDEX: 42.52 KG/M2 | OXYGEN SATURATION: 97 % | HEIGHT: 65 IN

## 2021-03-30 DIAGNOSIS — Z09 SURGICAL FOLLOWUP: ICD-10-CM

## 2021-03-30 DIAGNOSIS — E66.01 MORBID OBESITY (HCC): Primary | ICD-10-CM

## 2021-03-30 PROCEDURE — 99024 POSTOP FOLLOW-UP VISIT: CPT | Performed by: NURSE PRACTITIONER

## 2021-03-30 RX ORDER — LANOLIN ALCOHOL/MO/W.PET/CERES
1000 CREAM (GRAM) TOPICAL DAILY
COMMUNITY
End: 2021-07-30

## 2021-03-30 RX ORDER — CALCIUM CARBONATE/VITAMIN D3 600 MG-125
1200 TABLET ORAL
COMMUNITY

## 2021-03-30 RX ORDER — CYCLOBENZAPRINE HCL 10 MG
10 TABLET ORAL
Qty: 20 TAB | Refills: 0 | Status: SHIPPED | OUTPATIENT
Start: 2021-03-30

## 2021-03-30 NOTE — PROGRESS NOTES
2 weeks status post Sleeve gastrectomy   Pt reports doing well on liquids   Patient planes of some pain at her right larger incision site. Pain is with movement. Pt reports no nausea and no vomiting  Sheis drinking approximately 48 oz of water daily. She is drinking and eating 50-60 grams of protein daily. +Bm    She is taking all bariatric vitamins without issue. Total weight loss since surgery 19.8lbs  Weight loss since last visit 19.8lbs    Visit Vitals  /80   Pulse 81   Temp 98.6 °F (37 °C) (Oral)   Resp 18   Ht 5' 5\" (1.651 m)   Wt 255 lb 3.2 oz (115.8 kg)   LMP 03/01/2021 (Approximate)   SpO2 97%   BMI 42.47 kg/m²              Ms. Fer Paz has a reminder for a \"due or due soon\" health maintenance. I have asked that she contact her primary care provider for follow-up on this health maintenance. Physical Examination: General appearance - alert, well appearing, and in no distress,  Chest - clear to auscultation bilaterally  Heart - normal rate, regular rhythm, normal S1, S2, no murmurs, rubs, clicks or gallops  Abdomen - soft, nontender, nondistended  scars from previous incisions healing without erythema or induration    A/P    Doing well 2 weeks  Status post laparoscopic Sleeve gastrectomy  Diet advanced to soft foods  Focus on 50-60 grams of protein daily. Supplement with unflavored protein powder daily. Encouraged water intake to at least 64 oz of non-carbonated/no calorie beverages. We walk for exercise  Continue PPI  No lifting greater than 20 lbs  Follow up 2 weeks  RTW  4/27/2021    Pt verbalized understanding and questions were answered to the best of my knowledge and ability. Diet educational materials were provided.       France Owusu NP

## 2021-03-30 NOTE — PROGRESS NOTES
1. Have you been to the ER, urgent care clinic since your last visit? Hospitalized since your last visit? no    2. Have you seen or consulted any other health care providers outside of the [address] [place] [address] since your last visit? Include any pap smears or colon screening.  no

## 2021-03-30 NOTE — PROGRESS NOTES
[employer] RTW form completed and faxed with last office note as requested. Release on file. Confirmation received.

## 2021-03-30 NOTE — PATIENT INSTRUCTIONS
Constipation: Care Instructions Your Care Instructions Constipation means that you have a hard time passing stools (bowel movements). People pass stools from 3 times a day to once every 3 days. What is normal for you may be different. Constipation may occur with pain in the rectum and cramping. The pain may get worse when you try to pass stools. Sometimes there are small amounts of bright red blood on toilet paper or the surface of stools. This is because of enlarged veins near the rectum (hemorrhoids). A few changes in your diet and lifestyle may help you avoid ongoing constipation. Your doctor may also prescribe medicine to help loosen your stool. Some medicines can cause constipation. These include pain medicines and antidepressants. Tell your doctor about all the medicines you take. Your doctor may want to make a medicine change to ease your symptoms. Follow-up care is a key part of your treatment and safety. Be sure to make and go to all appointments, and call your doctor if you are having problems. It's also a good idea to know your test results and keep a list of the medicines you take. How can you care for yourself at home? · Drink plenty of fluids, enough so that your urine is light yellow or clear like water. If you have kidney, heart, or liver disease and have to limit fluids, talk with your doctor before you increase the amount of fluids you drink. · Include high-fiber foods in your diet each day. These include fruits, vegetables, beans, and whole grains. · Get at least 30 minutes of exercise on most days of the week. Walking is a good choice. You also may want to do other activities, such as running, swimming, cycling, or playing tennis or team sports. · Take a fiber supplement, such as Citrucel or Metamucil, every day. Read and follow all instructions on the label. · Schedule time each day for a bowel movement. A daily routine may help. Take your time having your bowel movement.  
· Support your feet with a small step stool when you sit on the toilet. This helps flex your hips and places your pelvis in a squatting position. · Your doctor may recommend an over-the-counter laxative to relieve your constipation. Examples are Milk of Magnesia and MiraLax. Read and follow all instructions on the label. Do not use laxatives on a long-term basis. When should you call for help? Call your doctor now or seek immediate medical care if: 
  · You have new or worse belly pain.  
  · You have new or worse nausea or vomiting.  
  · You have blood in your stools. Watch closely for changes in your health, and be sure to contact your doctor if: 
  · Your constipation is getting worse.  
  · You do not get better as expected. Where can you learn more? Go to http://ardenBilderoceline.info/ Enter 21 134.308.5808 in the search box to learn more about \"Constipation: Care Instructions. \" Current as of: June 26, 2019               Content Version: 12.6 © 0275-7364 [employer]. Care instructions adapted under license by [employer] (which disclaims liability or warranty for this information). If you have questions about a medical condition or this instruction, always ask your healthcare professional. [place] [address] any warranty or liability for your use of this information. What you need to know: 1. Advance your diet to soft foods. Follow the handout that you were given today in the office. 2.  Take the recommended vitamins daily 3. No lifting greater than 20 lbs. 4.  You can do light jogging and walking. 5  Follow up in 2 weeks. 6.  You may go into a pool. 7.  If you are not able to tolerate liquids or soft foods. Please call our office. [phone] 
8. If you have vomiting and persistent epigastric pain or chest pain. You should call our office, the doctor on-call or go to the emergency room.  Constipation Benefiber, Miralax & similar (once or twice daily) Milk of Magnesia (daily as needed) Dulcolax suppository Fleets Enema Soft and Mushy What is this diet?  Introduces soft, easy to digest foods  Low fat, no sugar added When do I begin?  Once instructed by your surgeon or NP. Usually 2 -3 weeks after surgery  You will stay on this diet until instructed to start the next phase. What foods can I eat?  Moist, mushy foods (see approved list of foods) Key Points  Continue to drink 48-64 ounces of low calorie, non-carbonated, sugar free beverages between meals.  Eat 3 meals per day  Measure each meal to ?cup per meal 
 Aim for 60 grams of protein every day. Try food sources of protein first.  
 Continue to supplement with protein shakes/powder to meet protein goals.  Take small bites. Try eating with smaller utensils (baby spoon, cocktail fork).  Chew food thoroughly  Allow about 30 minutes to eat a meal.  Eating too fast may cause nausea or vomiting.  Stop eating as soon as you feel full. Overeating may stretch your stomach's capacity and prevent desired weight loss.  Do not drink liquids during meals and 30 minutes after meals. Drinking with meals may cause nausea or vomiting.  Add one new food at a time  Take vitamins daily Shopping Lists Soft and Mushy In addition to everything on the Bariatric Liquid diet, you may add these foods to your diet. Protein - include with every meal 
 Egg or egg substitute  Low fat or fat-free cottage cheese  Low fat or fat-free yogurt  Low fat [place] yogurt  Fat-free, 1% milk, or Lactaid milk  Low-fat or vegetarian refried beans  Well-cooked beans and lentils  Fat-free or 2% reduced-fat cheese  Hummus  Low fat soup Snacks/Other Options:  Whole wheat crackers  Sugar free fudgsicles  Sugar free cocoa  No sugar added pudding Fruits and Vegetables  Applesauce (no sugar added)  Canned fruit (no sugar added)  Fresh soft peeled fruits (melons, banana, avocado, berries)  Any soft cooked vegetables  Mashed potatoes, Sweet potatoes, baked potatoes (no skin) Condiments  Fat free non-stick spray  Herbs and spices  Lite butter, margarine, canola oil, olive oil  Reduced-fat or fat-free munguia  Reduced-fat or fat-free salad dressing  Reduced-fat or fat-free cream cheese  Reduced-fat or fat-free sour cream 
 Lemon juice  Salt, pepper, mustard, ketchup, salsa Prepare food to the appropriate texture. Sample Meal Plan:  Soft and Mushy Breakfast ½ cup plain oatmeal with protein powder. Add cinnamon, nutmeg, Splenda brown sugar as desired for flavor 20-25 grams protein Snack (optional) High protein gelatin (recipe on www.unjury. com) 10 grams protein Lunch ½ cup low fat cottage cheese or [place] yogurt with soft fruit 10 - 15 grams protein Snack (optional) High protein pudding or high protein popsicle (recipe on www.unjury. com) 10 grams protein Dinner ¼ cup low-fat well cooked beans with low-fat cheese sprinkled on top ¼ cup no sugar added applesauce (can sprinkle protein powder) 5-8 grams protein 510  grams protein

## 2021-04-06 ENCOUNTER — TELEPHONE (OUTPATIENT)
Dept: SURGERY | Age: 36
End: 2021-04-06

## 2021-04-06 NOTE — TELEPHONE ENCOUNTER
Bariatric Post-Operative Phone Calls: Week 3    Diet:Question of any nausea and/or vomiting. Question of tolerance to diet advancement from liquids to solids. Protein intake (goal is 60 grams of protein daily)   Poor____Fair____Good__x__Great____     Comment:____Protein 60g__________________________________________________________      ______________________________________________________________________    Hydration:Less than 32 ounces of water daily is fair to poor (Goal is 64 ounces per day)   Poor____ Fair____ Good__x_Great____    Comment:____has consumed 48oz thus far today __________________________________________________________    ______________________________________________________________________      Ambulation:( walking at least 3 x week, for at least 30 minutes)   Poor______ Fair______ Good______     Great___x___ Comment:__________________________________________________    ______________________________________________________________________      Urine Color: Question of any odor and color(should be franchesca, pale, and clear) Dark______ Amber______ Pale__x____      Clear______ Comment:___________________________________________________                           ________________________________________________________________    Bowel movements: Question of any constipation- haven't had any bowel movements for more than 3 days. This could be related to protein intake and/or narcotic pain medication usage. Comment:                                                                                                                              Pain: Left sided abdominal pain is normal (should be less than 3)         Question if pain medication is helpful. 10___ 9___ 8___ 7___ 6___ 5___ 4___ 3___     2___1___0___Comment:______________________Some right side discomfort. Patient states muscle relaxant is effective. Patient told to splint abdomen when getting attempting to stand up. ___________________________    ______________________________________________________________________      Incision: (No redness, pain, swelling or fever) Healing Well____x__     Healed______Redness_________ Pain_________     Swelling_________ Fever__________(greater than 101 needs evaluation)    Comment:____________________________________________________________    ______________________________________________________________________  Use of incentive spirometer: Yes____       No    x       Next Appointment:__04/13/21__________                 Support Group: Yes______No__x____    Additional Comments:____________________________________________________________    ____________________________________________________________________      If more than one parameter is not met or considered poor, nurse/ needs to discuss with provider recommend for patient to be seen in the office as soon as possible or refer to the provider for follow-up. Reinforce to patient to use bariatric educational booklet as guide. It is appropriate to refer patient to the nutritionist to discuss more in detail of diet and nutrition.

## 2021-04-13 ENCOUNTER — VIRTUAL VISIT (OUTPATIENT)
Dept: SURGERY | Age: 36
End: 2021-04-13
Payer: COMMERCIAL

## 2021-04-13 VITALS — BODY MASS INDEX: 41.82 KG/M2 | HEIGHT: 65 IN | WEIGHT: 251 LBS

## 2021-04-13 DIAGNOSIS — E66.01 MORBID OBESITY (HCC): Primary | ICD-10-CM

## 2021-04-13 DIAGNOSIS — Z09 SURGICAL FOLLOWUP: ICD-10-CM

## 2021-04-13 PROCEDURE — 99024 POSTOP FOLLOW-UP VISIT: CPT | Performed by: NURSE PRACTITIONER

## 2021-04-13 NOTE — PROGRESS NOTES
1. Have you been to the ER, urgent care clinic since your last visit? Hospitalized since your last visit? no    2. Have you seen or consulted any other health care providers outside of the [address] [place] [address] since your last visit? Include any pap smears or colon screening.  no

## 2021-04-13 NOTE — PROGRESS NOTES
I was in the office while conducting this encounter. Consent:  She and/or her healthcare decision maker is aware that this patient-initiated Telehealth encounter is a billable service, with coverage as determined by her insurance carrier. She is aware that she may receive a bill and has provided verbal consent to proceed: Yes    This virtual visit was conducted via [employer]. Pursuant to the emergency declaration under the [facility] [place] [address] waiver authority and the [employer] and [employer] Act, this Virtual  Visit was conducted to reduce the patient's risk of exposure to COVID-19 and provide continuity of care for an established patient. Services were provided through a video synchronous discussion virtually to substitute for in-person clinic visit. Due to this being a TeleHealth evaluation, many elements of the physical examination are unable to be assessed. Total Time: minutes: 11-20 minutes. 4 weeks status post Sleeve gastrectomy   Pt reports doing well on liquids . Patient pain is much better. She states the Flexeril helped. Pt reports no nausea and no vomiting  Sheis drinking approximately 48+ oz of water daily. She is drinking and eating 50-60  grams of protein daily. She is taking all bariatric vitamins without issue. Total weight loss since surgery 25.5lbs  Weight loss since last visit 4.2lbs    Visit Vitals  Ht 5' 5\" (1.651 m)   Wt 251 lb (113.9 kg)   BMI 41.77 kg/m²            Ms. Catarino Roger has a reminder for a \"due or due soon\" health maintenance. I have asked that she contact her primary care provider for follow-up on this health maintenance.       Physical Examination: General appearance - alert, well appearing, and in no distress,  Chest -no respiratory distress    Abdomen -incisions healing well    A/P    Doing well 4 weeks  Status post laparoscopic Sleeve gastrectomy  Diet advanced to soft meats  Focus on 50-60 grams of protein daily. Supplement with unflavored protein powder daily. Encouraged water intake to at least 64 oz of non-carbonated/no calorie beverages. May walk for exercise  Continue PPI  No lifting greater than 40 lbs  Follow up 2 weeks  RTW 4/27/2021    Pt verbalized understanding and questions were answered to the best of my knowledge and ability.         Brunilda Allison, NP

## 2021-04-13 NOTE — PATIENT INSTRUCTIONS
Constipation: Care Instructions Your Care Instructions Constipation means that you have a hard time passing stools (bowel movements). People pass stools from 3 times a day to once every 3 days. What is normal for you may be different. Constipation may occur with pain in the rectum and cramping. The pain may get worse when you try to pass stools. Sometimes there are small amounts of bright red blood on toilet paper or the surface of stools. This is because of enlarged veins near the rectum (hemorrhoids). A few changes in your diet and lifestyle may help you avoid ongoing constipation. Your doctor may also prescribe medicine to help loosen your stool. Some medicines can cause constipation. These include pain medicines and antidepressants. Tell your doctor about all the medicines you take. Your doctor may want to make a medicine change to ease your symptoms. Follow-up care is a key part of your treatment and safety. Be sure to make and go to all appointments, and call your doctor if you are having problems. It's also a good idea to know your test results and keep a list of the medicines you take. How can you care for yourself at home? · Drink plenty of fluids. If you have kidney, heart, or liver disease and have to limit fluids, talk with your doctor before you increase the amount of fluids you drink. · Include high-fiber foods in your diet each day. These include fruits, vegetables, beans, and whole grains. · Get at least 30 minutes of exercise on most days of the week. Walking is a good choice. You also may want to do other activities, such as running, swimming, cycling, or playing tennis or team sports. · Take a fiber supplement, such as Citrucel or Metamucil, every day. Read and follow all instructions on the label. · Schedule time each day for a bowel movement. A daily routine may help. Take your time having your bowel movement.  
· Support your feet with a small step stool when you sit on the toilet. This helps flex your hips and places your pelvis in a squatting position. · Your doctor may recommend an over-the-counter laxative to relieve your constipation. Examples are Milk of Magnesia and MiraLax. Read and follow all instructions on the label. Do not use laxatives on a long-term basis. When should you call for help? Call your doctor now or seek immediate medical care if: 
  · You have new or worse belly pain.  
  · You have new or worse nausea or vomiting.  
  · You have blood in your stools. Watch closely for changes in your health, and be sure to contact your doctor if: 
  · Your constipation is getting worse.  
  · You do not get better as expected. Where can you learn more? Go to http://arden-celine.info/ Enter 21 493.407.4472 in the search box to learn more about \"Constipation: Care Instructions. \" Current as of: February 26, 2020               Content Version: 12.8 © 2006-2021 [employer]. Care instructions adapted under license by "[employer] (which disclaims liability or warranty for this information). If you have questions about a medical condition or this instruction, always ask your healthcare professional. [place] [address] any warranty or liability for your use of this information. What you need to know: 
1 . Advance your diet to moist meats. Follow the handout that you were given today in the office. 2. Take the recommended vitamins daily 3 No lifting greater than 40 lbs. 4. You can do light jogging, moderate walking and a recumbent bike. 5 Follow up in 2 weeks. 6. You may go into a pool. 7. If you are not able to tolerate liquids, soft foods or moist meats. Please call our office. [phone] 
8. If you have vomiting and persistent epigastric pain or chest pain. You should call our office, the doctor on-call or go to the emergency room.  Constipation Benefiber, Miralax & similar (once or twice daily) Milk of Magnesia (daily as needed) Dulcolax suppository Fleets Enema Moist Meats What is this diet?  This phase adds moist meats in addition to foods in Soft & Mushy  Lean protein sources When do I begin?  When directed by your NP or surgeon, usually 4 weeks after surgery What new foods can I eat?  Moist meat and poultry  Moist fish and seafood Shopping Lists Protein - include with every meal 
 Tuna packed in water  [place] (canned, frozen, fresh)  White flaky fish (negro, cod, flounder, tilapia)  Canned chicken packed in water  96-99% fat free thinly sliced deli meat (ham, turkey, chicken)  Silken Tofu  
 Skinless turkey or chicken (prepare to a soft texture)  Lean ground meat  Lean pork (cooked until very tender, cut into small pieces) Sample Meal Plan:  Moist Meats Breakfast ½ cup soft cooked eggs (2) 16 grams protein Snack (optional) Low-fat string cheese 5 grams protein Lunch 2 slices of lean deli turkey (2 oz.), ¼ cup soft fruit or ½ cup tuna salad made with low-fat mayonnaise 14 grams protein 14 grams protein Snack (optional) Greek yogurt or low-fat cottage cheese or low-fat string cheese 8-15 grams protein Dinner Soft/flaky fish (2 oz.) ¼ cup soft cooked vegetables 14 grams protein

## 2021-04-27 ENCOUNTER — VIRTUAL VISIT (OUTPATIENT)
Dept: SURGERY | Age: 36
End: 2021-04-27
Payer: COMMERCIAL

## 2021-04-27 VITALS — WEIGHT: 250 LBS | BODY MASS INDEX: 41.65 KG/M2 | HEIGHT: 65 IN

## 2021-04-27 DIAGNOSIS — Z09 SURGICAL FOLLOWUP: ICD-10-CM

## 2021-04-27 DIAGNOSIS — E66.01 MORBID OBESITY (HCC): Primary | ICD-10-CM

## 2021-04-27 PROCEDURE — 99024 POSTOP FOLLOW-UP VISIT: CPT | Performed by: NURSE PRACTITIONER

## 2021-04-27 RX ORDER — OMEPRAZOLE 40 MG/1
CAPSULE, DELAYED RELEASE ORAL
Qty: 30 CAP | Refills: 1 | Status: SHIPPED | OUTPATIENT
Start: 2021-04-27 | End: 2021-05-24

## 2021-04-27 NOTE — PROGRESS NOTES
I was in the office while conducting this encounter. Consent:  She and/or her healthcare decision maker is aware that this patient-initiated Telehealth encounter is a billable service, with coverage as determined by her insurance carrier. She is aware that she may receive a bill and has provided verbal consent to proceed: Yes    This virtual visit was conducted via [employer]. Pursuant to the emergency declaration under the [facility], [facility] waiver authority and the [employer] and [employer] Act, this Virtual  Visit was conducted to reduce the patient's risk of exposure to COVID-19 and provide continuity of care for an established patient. Services were provided through a video synchronous discussion virtually to substitute for in-person clinic visit. Due to this being a TeleHealth evaluation, many elements of the physical examination are unable to be assessed. Total Time: minutes: 11-20 minutes. 6 weeks status post Sleeve gastrectomy   Pt reports doing well on liquids, soft and soft meats. .    Patient no complaints of pain  Pt reports no nausea and no vomiting  Sheis drinking approximately 64 oz of water daily. She is drinking and eating 50-60 grams of protein daily. She states that she feels really tired. She is following up with her PCP to check her TSH. She is taking all bariatric vitamins without issue. Total weight loss since surgery 26.5lbs  Weight loss since last visit 1lbs    Visit Vitals  Ht 5' 5\" (1.651 m)   Wt 250 lb (113.4 kg)   BMI 41.60 kg/m²              Ms. Coker has a reminder for a \"due or due soon\" health maintenance. I have asked that she contact her primary care provider for follow-up on this health maintenance. Physical Examination: General appearance - alert, well appearing, and in no distress,  Chest - no respiratory distress    Abdomen - incisions healed per patient. She's at work. A/P    Doing well 6 weeks  Status post laparoscopic Sleeve gastrectomy  Diet advanced to solid foods. Advised patient regard to diet that is high-protein, low-fat, low-sugar, limited carbohydrates. Strive for 50-60 grams of protein daily. If having a snack, foods that are protein or fiber rich. . No eating/drinking together, chew foods well, and portion control. Measure meals. Discussed snacking behavior and to [facility] pay attention to behavioral factor and habits. Drink at least 40-64 ounces of water or non-calorie/non-carbonated beverages daily. Continue vitamin regiment daily. Exercise at least 3 days a week with cardiovascular and strength training. Patient to follow up in 10 weeks. . Advised to call office if any questions/concerns. Letter written with some lifting restrictions, stooping and bending as she is just getting back to work. 11 Minutes spent face to face with patient, >50 % of time spent counseling.          Gabe Powers NP

## 2021-04-27 NOTE — PROGRESS NOTES
1. Have you been to the ER, urgent care clinic since your last visit? Hospitalized since your last visit? No    2. Have you seen or consulted any other health care providers outside of the [address] [place] [address] since your last visit? Include any pap smears or colon screening.  No

## 2021-04-27 NOTE — PATIENT INSTRUCTIONS
Eating Healthy Foods: Care Instructions Your Care Instructions Eating healthy foods can help lower your risk for disease. Healthy food gives you energy and keeps your heart strong, your brain active, your muscles working, and your bones strong. A healthy diet includes a variety of foods from the basic food groups: grains, vegetables, fruits, milk and milk products, and meat and beans. Some people may eat more of their favorite foods from only one food group and, as a result, miss getting the nutrients they need. So, it is important to pay attention not only to what you eat but also to what you are missing from your diet. You can eat a healthy, balanced diet by making a few small changes. Follow-up care is a key part of your treatment and safety. Be sure to make and go to all appointments, and call your doctor if you are having problems. It's also a good idea to know your test results and keep a list of the medicines you take. How can you care for yourself at home? Look at what you eat · Keep a food diary for a week or two and record everything you eat or drink. Track the number of servings you eat from each food group. · For a balanced diet every day, eat a variety of: 
? 6 or more ounce-equivalents of grains, such as cereals, breads, crackers, rice, or pasta, every day. An ounce-equivalent is 1 slice of bread, 1 cup of ready-to-eat cereal, or ½ cup of cooked rice, cooked pasta, or cooked cereal. 
? 2½ cups of vegetables, especially: § Dark-green vegetables such as broccoli and spinach. § Orange vegetables such as carrots and sweet potatoes. § Dry beans (such as martínez and kidney beans) and peas (such as lentils). ? 2 cups of fresh, frozen, or canned fruit. A small apple or 1 banana or orange equals 1 cup. ? 3 cups of nonfat or low-fat milk, yogurt, or other milk products. ? 5½ ounces of meat and beans, such as chicken, fish, lean meat, beans, nuts, and seeds.  One egg, 1 tablespoon of peanut butter, ½ ounce nuts or seeds, or ¼ cup of cooked beans equals 1 ounce of meat. · Learn how to read food labels for serving sizes and ingredients. Fast-food and convenience-food meals often contain few or no fruits or vegetables. Make sure you eat some fruits and vegetables to make the meal more nutritious. · Look at your food diary. For each food group, add up what you have eaten and then divide the total by the number of days. This will give you an idea of how much you are eating from each food group. See if you can find some ways to change your diet to make it more healthy. Start small · Do not try to make dramatic changes to your diet all at once. You might feel that you are missing out on your favorite foods and then be more likely to fail. · Start slowly, and gradually change your habits. Try some of the following: ? Use whole wheat bread instead of white bread. ? Use nonfat or low-fat milk instead of whole milk. ? Eat brown rice instead of white rice, and eat whole wheat pasta instead of white-flour pasta. ? Try low-fat cheeses and low-fat yogurt. ? Add more fruits and vegetables to meals and have them for snacks. ? Add lettuce, tomato, cucumber, and onion to sandwiches. ? Add fruit to yogurt and cereal. 
Enjoy food · You can still eat your favorite foods. You just may need to eat less of them. If your favorite foods are high in fat, salt, and sugar, limit how often you eat them, but do not cut them out entirely. · Eat a wide variety of foods. Make healthy choices when eating out · The type of restaurant you choose can help you make healthy choices. Even fast-food chains are now offering more low-fat or healthier choices on the menu. · Choose smaller portions, or take half of your meal home. · When eating out, try: ? A veggie pizza with a whole wheat crust or grilled chicken (instead of sausage or pepperoni).  
? Pasta with roasted vegetables, grilled chicken, or marinara sauce instead of cream sauce. ? A vegetable wrap or grilled chicken wrap. ? Broiled or poached food instead of fried or breaded items. Make healthy choices easy · Buy packaged, prewashed, ready-to-eat fresh vegetables and fruits, such as baby carrots, salad mixes, and chopped or shredded broccoli and cauliflower. · Buy packaged, presliced fruits, such as melon or pineapple. · Choose 100% fruit or vegetable juice instead of soda. Limit juice intake to 4 to 6 oz (½ to ¾ cup) a day. · Blend low-fat yogurt, fruit juice, and canned or frozen fruit to make a smoothie for breakfast or a snack. Where can you learn more? Go to http://www.cannon.com/ Enter T756 in the search box to learn more about \"Eating Healthy Foods: Care Instructions. \" Current as of: December 17, 2020               Content Version: 12.8 © 2006-2021 Healthwise, [facility]. Care instructions adapted under license by [employer] (which disclaims liability or warranty for this information). If you have questions about a medical condition or this instruction, always ask your healthcare professional. [place] [address] any warranty or liability for your use of this information.

## 2021-04-27 NOTE — LETTER
NOTIFICATION RETURN TO WORK / SCHOOL 
 
4/27/2021 10:39 AM 
 
Ms. Alma Clement [address] [place] [address] To Whom It May Concern: 
 
Alma Clement is currently under the care of Ana María Argueta. She will return to work/school on 4/27/2021. No lifting greater than 50 lbs for 2 weeks. Limit stooping and bending. If there are questions or concerns please have the patient contact our office. Sincerely, Vlad Nelson NP

## 2021-05-24 RX ORDER — OMEPRAZOLE 40 MG/1
CAPSULE, DELAYED RELEASE ORAL
Qty: 30 CAPSULE | Refills: 1 | Status: SHIPPED | OUTPATIENT
Start: 2021-05-24 | End: 2021-07-13

## 2021-06-28 ENCOUNTER — TELEPHONE (OUTPATIENT)
Dept: SURGERY | Age: 36
End: 2021-06-28

## 2021-06-28 NOTE — TELEPHONE ENCOUNTER
Patient called and stated that she is having some sharp right side pain and stated that it is pretty intense and wanted to know if there was anything she could do. Patient stated that she is having some constipation issues.

## 2021-06-28 NOTE — TELEPHONE ENCOUNTER
Returned call to Ms [facility] left advised to please call the office back. Patient returned call verified all PHI. Patient states she is having sharp pain Rt sided 7/10. She states its been consistent for the past 5 days. She isn't taking anything for pain. She is voiding last BM was this morning small amount last good BM was about  Week ago. I advised patient to take miralex if still no BM take another dose this evening drink fluids and get self up moving around. I advised patent to call the office if still no relief.

## 2021-06-28 NOTE — TELEPHONE ENCOUNTER
Reviewed notes patient had Laparoscopic sleeve gastrectomy on 3/16/21. She was last seen by Ana Maria Leslie NP on 4/27/21.

## 2021-07-12 RX ORDER — ERGOCALCIFEROL 1.25 MG/1
CAPSULE ORAL
Qty: 5 CAPSULE | Refills: 2 | Status: SHIPPED | OUTPATIENT
Start: 2021-07-12 | End: 2021-11-08

## 2021-07-13 ENCOUNTER — TELEPHONE (OUTPATIENT)
Dept: SURGERY | Age: 36
End: 2021-07-13

## 2021-07-13 RX ORDER — OMEPRAZOLE 40 MG/1
CAPSULE, DELAYED RELEASE ORAL
Qty: 30 CAPSULE | Refills: 1 | Status: SHIPPED | OUTPATIENT
Start: 2021-07-13

## 2021-07-13 NOTE — TELEPHONE ENCOUNTER
Follow up call made to Ms [facility]. Patient advised per Leonard Jorgensen NP need to schedule follow up appointment. Continue take the miralex 2 times a day increase the omeprazole to 2 times a day until seen by Susie Colmenares NP next week.

## 2021-07-13 NOTE — TELEPHONE ENCOUNTER
Returned call to [facility] verified all PHI. Patient had Laparoscopic sleeve gastrectomy on 3/16/21.      Patient states she continues to have the constipation. She is having SM BM every few days dry and hard. She is taking miralex daily. There is some abdominal discomfort passing gas no N/V. I suggested taking the miralex 2 times a day try stool softener and or senna tab. She also has acid reflux past few days which worsened. She is taking the omeprazole 40 mg cap daily. She is eating oatmeal, high fiber fruits, leafy greens, salmon, shrimp. She drinks 64 oz water a day. Consumes 60 gms protein a day. I informed patient I will send message to the NP [facility].

## 2021-07-13 NOTE — TELEPHONE ENCOUNTER
Pt reports constipation and acid reflux. Was told to call back if the constipation was not any better and it is not.

## 2021-07-30 ENCOUNTER — OFFICE VISIT (OUTPATIENT)
Dept: SURGERY | Age: 36
End: 2021-07-30
Payer: COMMERCIAL

## 2021-07-30 VITALS
RESPIRATION RATE: 18 BRPM | SYSTOLIC BLOOD PRESSURE: 115 MMHG | WEIGHT: 236 LBS | DIASTOLIC BLOOD PRESSURE: 75 MMHG | BODY MASS INDEX: 39.32 KG/M2 | TEMPERATURE: 98.6 F | OXYGEN SATURATION: 98 % | HEIGHT: 65 IN | HEART RATE: 76 BPM

## 2021-07-30 DIAGNOSIS — E66.01 MORBID OBESITY (HCC): Primary | ICD-10-CM

## 2021-07-30 DIAGNOSIS — K59.00 CONSTIPATION, UNSPECIFIED CONSTIPATION TYPE: ICD-10-CM

## 2021-07-30 DIAGNOSIS — Z98.84 S/P LAPAROSCOPIC SLEEVE GASTRECTOMY: ICD-10-CM

## 2021-07-30 PROCEDURE — 99212 OFFICE O/P EST SF 10 MIN: CPT | Performed by: NURSE PRACTITIONER

## 2021-07-30 RX ORDER — CYANOCOBALAMIN 1000 UG/ML
1000 INJECTION, SOLUTION INTRAMUSCULAR; SUBCUTANEOUS ONCE
Qty: 3 VIAL | Refills: 3 | Status: SHIPPED | OUTPATIENT
Start: 2021-07-30 | End: 2022-10-25

## 2021-07-30 RX ORDER — SYRINGE W-NEEDLE,DISPOSAB,3 ML 25GX1"
1 SYRINGE, EMPTY DISPOSABLE MISCELLANEOUS
Qty: 3 SYRINGE | Refills: 3 | Status: SHIPPED | OUTPATIENT
Start: 2021-07-30 | End: 2022-10-26 | Stop reason: SDUPTHER

## 2021-07-30 NOTE — PROGRESS NOTES
1. Have you been to the ER, urgent care clinic since your last visit? Hospitalized since your last visit? No    2. Have you seen or consulted any other health care providers outside of the [address] [place] [address] since your last visit? Include any pap smears or colon screening.  No

## 2021-07-30 NOTE — PROGRESS NOTES
HISTORY OF PRESENT ILLNESS  Ferny Gloria is a 38 y.o. female with previous  Sleeve gastrectomy 4 months ago . She has lost a total of  40 lb s pounds since surgery. She  has lost 14 lbs lbs since the last ov. Body mass index is 43.83 kg/m². . Some nausea.  + Acid reflux/heartburn, taking prilosec. . Drinking  64 ounces of water daily. 50 grams protein intake daily. + BM's, taking Miralax. She complains of some right-sided abdominal pain with constipation. The pain gets less when she has moved her bowels. She is taking MiraLAX twice daily pt is going to the gym for exercise. Patient requested B12 injections  Dietary recall -    Breakfast- protein waffle, lunch  Lunch- atkins meals  Dinner- baked chicken and veg    She is snacking between meals; skinny pop and jerky. Vitamins:  MVI : yes  Calcium : yes  B-Vit 12: yes  Vit D: Yes        Ms. Luna has a reminder for a \"due or due soon\" health maintenance. I have asked that she contact her primary care provider for follow-up on this health maintenance. COMORBIDITY     SLEEP APNEA                 NO        GERD  (req.meds)            NO  HYPERLIPIDEMIA            NO  HYPERTENSION              NO         DIABETES                         NO           Current Outpatient Medications:     omeprazole (PRILOSEC) 40 mg capsule, TAKE 1 CAPSULE BY MOUTH EVERY DAY, Disp: 30 Capsule, Rfl: 1    ergocalciferol (ERGOCALCIFEROL) 1,250 mcg (50,000 unit) capsule, TAKE 1 CAPSULE BY MOUTH EVERY 7 DAYS FOR LOW VITAMIN D LEVELS, Disp: 5 Capsule, Rfl: 2    cyanocobalamin 1,000 mcg tablet, Take 1,000 mcg by mouth daily. , Disp: , Rfl:     calcium-cholecalciferol, d3, (CALCIUM 600 + D) 600-125 mg-unit tab, Take 1,200 mg by mouth., Disp: , Rfl:     cyclobenzaprine (FLEXERIL) 10 mg tablet, Take 1 Tab by mouth three (3) times daily as needed for Muscle Spasm(s). , Disp: 20 Tab, Rfl: 0    polyethylene glycol (MIRALAX) 17 gram packet, Take 1 Packet by mouth daily.  Indications: constipation, Disp: 90 Packet, Rfl: 3    Lisdexamfetamine (Vyvanse) 70 mg cap, Take 70 mg by mouth daily. , Disp: , Rfl:     multivitamin (ONE A DAY) tablet, Take 1 Tab by mouth daily. , Disp: , Rfl:     levothyroxine (SYNTHROID) 88 mcg tablet, levothyroxine 88 mcg tablet  TAKE 1 TABLET BY MOUTH ONCE DAILY, Disp: , Rfl:     ondansetron (ZOFRAN ODT) 4 mg disintegrating tablet, Take 1 Tab by mouth every eight (8) hours as needed for Nausea or Nausea or Vomiting (AFTER SURGERY). (Patient not taking: Reported on 7/30/2021), Disp: 20 Tab, Rfl: 0    hyoscyamine SL (Levsin/SL) 0.125 mg SL tablet, 1 Tab by SubLINGual route every four (4) hours as needed for Cramping (CHEST PRESSURE/ SPASM AFTER SURGERY). (Patient not taking: Reported on 7/30/2021), Disp: 30 Tab, Rfl: 0      Visit Vitals  /75 (BP 1 Location: Right arm, BP Patient Position: Sitting, BP Cuff Size: Adult)   Pulse 76   Temp 98.6 °F (37 °C) (Oral)   Resp 18   Ht 5' 5\" (1.651 m)   Wt 263 lb 6.4 oz (119.5 kg)   SpO2 98%   BMI 43.83 kg/m²     HPI    Review of Systems   Respiratory: Negative for shortness of breath. Cardiovascular: Negative for chest pain. Gastrointestinal: Negative for abdominal pain, heartburn, nausea and vomiting. Neurological: Positive for dizziness (when she changes positions quickly). Negative for headaches. Physical Exam  Constitutional:       Appearance: She is well-developed. HENT:      Mouth/Throat:      Mouth: Mucous membranes are moist.   Cardiovascular:      Rate and Rhythm: Normal rate and regular rhythm. Heart sounds: No murmur heard. No friction rub. No gallop. Pulmonary:      Effort: Pulmonary effort is normal.      Breath sounds: Normal breath sounds. Abdominal:      General: Bowel sounds are normal. There is no distension. Palpations: Abdomen is soft. Tenderness: There is abdominal tenderness (slight tenderness right upper quadrant).       Comments: No masses or hernias noted Musculoskeletal:      Cervical back: Normal range of motion and neck supple. Skin:     General: Skin is warm and dry. Neurological:      Mental Status: She is alert and oriented to person, place, and time. ASSESSMENT and [address] is a 38 y.o. female with previous  Sleeve gastrectomy 4 months ago . She has lost a total of  40 lb s pounds since surgery. She  has lost 14 lbs lbs since the last ov. Body mass index is 43.83 kg/m². . Some nausea.  + Acid reflux/heartburn, taking prilosec. . Drinking  64 ounces of water daily. 50 grams protein intake daily. + BM's, taking Miralax. She complains of some right-sided abdominal pain with constipation. The pain gets less when she has moved her bowels. She is taking MiraLAX twice daily pt is going to the gym for exercise. Advised patient regard to diet that is high-protein, low-fat, low-sugar, limited carbohydrates. Strive for 50-60 grams of protein daily. If having a snack, foods that are protein or fiber rich. . No eating/drinking together, chew foods well, and portion control. Measure meals. Discussed snacking behavior and to [facility] pay attention to behavioral factor and habits. Drink at least 40-64 ounces of water or non-calorie/non-carbonated beverages daily. Continue vitamin regiment daily. Exercise at least 3 days a week with cardiovascular and strength training. Patient to follow up in 4 months. Advised to call office if any questions/concerns. Advised to try Colace, smooth move tea and milk of magnesia as needed. Advised to stop B12 tablet. B12 injection sent to pharmacy. 15 Minutes spent face to face with patient, >50 % of time spent counseling.

## 2021-07-30 NOTE — PATIENT INSTRUCTIONS
Constipation: Care Instructions  Your Care Instructions     Constipation means that you have a hard time passing stools (bowel movements). People pass stools from 3 times a day to once every 3 days. What is normal for you may be different. Constipation may occur with pain in the rectum and cramping. The pain may get worse when you try to pass stools. Sometimes there are small amounts of bright red blood on toilet paper or the surface of stools. This is because of enlarged veins near the rectum (hemorrhoids). A few changes in your diet and lifestyle may help you avoid ongoing constipation. Your doctor may also prescribe medicine to help loosen your stool. Some medicines can cause constipation. These include pain medicines and antidepressants. Tell your doctor about all the medicines you take. Your doctor may want to make a medicine change to ease your symptoms. Follow-up care is a key part of your treatment and safety. Be sure to make and go to all appointments, and call your doctor if you are having problems. It's also a good idea to know your test results and keep a list of the medicines you take. How can you care for yourself at home? · Drink plenty of fluids. If you have kidney, heart, or liver disease and have to limit fluids, talk with your doctor before you increase the amount of fluids you drink. · Include high-fiber foods in your diet each day. These include fruits, vegetables, beans, and whole grains. · Get at least 30 minutes of exercise on most days of the week. Walking is a good choice. You also may want to do other activities, such as running, swimming, cycling, or playing tennis or team sports. · Take a fiber supplement, such as Citrucel or Metamucil, every day. Read and follow all instructions on the label. · Schedule time each day for a bowel movement. A daily routine may help. Take your time having your bowel movement.   · Support your feet with a small step stool when you sit on the toilet. This helps flex your hips and places your pelvis in a squatting position. · Your doctor may recommend an over-the-counter laxative to relieve your constipation. Examples are Milk of Magnesia and MiraLax. Read and follow all instructions on the label. Do not use laxatives on a long-term basis. When should you call for help? Call your doctor now or seek immediate medical care if:    · You have new or worse belly pain.     · You have new or worse nausea or vomiting.     · You have blood in your stools. Watch closely for changes in your health, and be sure to contact your doctor if:    · Your constipation is getting worse.     · You do not get better as expected. Where can you learn more? Go to http://www.cannon.com/  Enter P343 in the search box to learn more about \"Constipation: Care Instructions. \"  Current as of: February 26, 2020               Content Version: 12.8  © 9861-3061 [employer]. Care instructions adapted under license by [employer] (which disclaims liability or warranty for this information). If you have questions about a medical condition or this instruction, always ask your healthcare professional. [place] [address] any warranty or liability for your use of this information.     Smooth move tea  Colace  Milk of magnesia

## 2021-11-08 RX ORDER — ERGOCALCIFEROL 1.25 MG/1
CAPSULE ORAL
Qty: 5 CAPSULE | Refills: 2 | Status: SHIPPED | OUTPATIENT
Start: 2021-11-08 | End: 2022-01-28

## 2021-11-16 ENCOUNTER — TELEPHONE (OUTPATIENT)
Dept: SURGERY | Age: 36
End: 2021-11-16

## 2021-11-16 NOTE — TELEPHONE ENCOUNTER
I tried contacting the patient to reschedule her 11/30 appointment to the following week or next available. HENYR Meza will NOT be in office on 11/30. LVM for the pt to c/b and reschedule.

## 2021-12-16 ENCOUNTER — TRANSCRIBE ORDER (OUTPATIENT)
Dept: SCHEDULING | Age: 36
End: 2021-12-16

## 2021-12-16 DIAGNOSIS — N94.6 DYSMENORRHEA: ICD-10-CM

## 2021-12-16 DIAGNOSIS — N93.8 DYSFUNCTIONAL UTERINE BLEEDING: ICD-10-CM

## 2021-12-16 DIAGNOSIS — D21.9 FIBROID: Primary | ICD-10-CM

## 2021-12-17 ENCOUNTER — OFFICE VISIT (OUTPATIENT)
Dept: SURGERY | Age: 36
End: 2021-12-17
Payer: COMMERCIAL

## 2021-12-17 VITALS
OXYGEN SATURATION: 98 % | RESPIRATION RATE: 18 BRPM | TEMPERATURE: 98.5 F | WEIGHT: 226 LBS | HEIGHT: 65 IN | DIASTOLIC BLOOD PRESSURE: 78 MMHG | BODY MASS INDEX: 37.65 KG/M2 | HEART RATE: 66 BPM | SYSTOLIC BLOOD PRESSURE: 128 MMHG

## 2021-12-17 DIAGNOSIS — E55.9 VITAMIN D DEFICIENCY: ICD-10-CM

## 2021-12-17 DIAGNOSIS — Z98.84 S/P LAPAROSCOPIC SLEEVE GASTRECTOMY: ICD-10-CM

## 2021-12-17 DIAGNOSIS — E53.8 VITAMIN B12 DEFICIENCY: ICD-10-CM

## 2021-12-17 DIAGNOSIS — D64.9 ANEMIA, UNSPECIFIED TYPE: ICD-10-CM

## 2021-12-17 DIAGNOSIS — E66.01 MORBID OBESITY (HCC): Primary | ICD-10-CM

## 2021-12-17 PROCEDURE — 99212 OFFICE O/P EST SF 10 MIN: CPT | Performed by: NURSE PRACTITIONER

## 2021-12-17 NOTE — PROGRESS NOTES
1. Have you been to the ER, urgent care clinic since your last visit? Hospitalized since your last visit? no    2. Have you seen or consulted any other health care providers outside of the [address] [place] [address] since your last visit? Include any pap smears or colon screening.  no

## 2021-12-17 NOTE — PATIENT INSTRUCTIONS
Eating Healthy Foods: Care Instructions  Your Care Instructions     Eating healthy foods can help lower your risk for disease. Healthy food gives you energy and keeps your heart strong, your brain active, your muscles working, and your bones strong. A healthy diet includes a variety of foods from the basic food groups: grains, vegetables, fruits, milk and milk products, and meat and beans. Some people may eat more of their favorite foods from only one food group and, as a result, miss getting the nutrients they need. So, it is important to pay attention not only to what you eat but also to what you are missing from your diet. You can eat a healthy, balanced diet by making a few small changes. Follow-up care is a key part of your treatment and safety. Be sure to make and go to all appointments, and call your doctor if you are having problems. It's also a good idea to know your test results and keep a list of the medicines you take. How can you care for yourself at home? Look at what you eat  · Keep a food diary for a week or two and record everything you eat or drink. Track the number of servings you eat from each food group. · For a balanced diet every day, eat a variety of:  ? 6 or more ounce-equivalents of grains, such as cereals, breads, crackers, rice, or pasta, every day. An ounce-equivalent is 1 slice of bread, 1 cup of ready-to-eat cereal, or ½ cup of cooked rice, cooked pasta, or cooked cereal.  ? 2½ cups of vegetables, especially:  § Dark-green vegetables such as broccoli and spinach. § Orange vegetables such as carrots and sweet potatoes. § Dry beans (such as martínez and kidney beans) and peas (such as lentils). ? 2 cups of fresh, frozen, or canned fruit. A small apple or 1 banana or orange equals 1 cup. ? 3 cups of nonfat or low-fat milk, yogurt, or other milk products. ? 5½ ounces of meat and beans, such as chicken, fish, lean meat, beans, nuts, and seeds.  One egg, 1 tablespoon of peanut butter, ½ ounce nuts or seeds, or ¼ cup of cooked beans equals 1 ounce of meat. · Learn how to read food labels for serving sizes and ingredients. Fast-food and convenience-food meals often contain few or no fruits or vegetables. Make sure you eat some fruits and vegetables to make the meal more nutritious. · Look at your food diary. For each food group, add up what you have eaten and then divide the total by the number of days. This will give you an idea of how much you are eating from each food group. See if you can find some ways to change your diet to make it more healthy. Start small  · Do not try to make dramatic changes to your diet all at once. You might feel that you are missing out on your favorite foods and then be more likely to fail. · Start slowly, and gradually change your habits. Try some of the following:  ? Use whole wheat bread instead of white bread. ? Use nonfat or low-fat milk instead of whole milk. ? Eat brown rice instead of white rice, and eat whole wheat pasta instead of white-flour pasta. ? Try low-fat cheeses and low-fat yogurt. ? Add more fruits and vegetables to meals and have them for snacks. ? Add lettuce, tomato, cucumber, and onion to sandwiches. ? Add fruit to yogurt and cereal.  Enjoy food  · You can still eat your favorite foods. You just may need to eat less of them. If your favorite foods are high in fat, salt, and sugar, limit how often you eat them, but do not cut them out entirely. · Eat a wide variety of foods. Make healthy choices when eating out  · The type of restaurant you choose can help you make healthy choices. Even fast-food chains are now offering more low-fat or healthier choices on the menu. · Choose smaller portions, or take half of your meal home. · When eating out, try:  ? A veggie pizza with a whole wheat crust or grilled chicken (instead of sausage or pepperoni).   ? Pasta with roasted vegetables, grilled chicken, or marinara sauce instead of cream sauce. ? A vegetable wrap or grilled chicken wrap. ? Broiled or poached food instead of fried or breaded items. Make healthy choices easy  · Buy packaged, prewashed, ready-to-eat fresh vegetables and fruits, such as baby carrots, salad mixes, and chopped or shredded broccoli and cauliflower. · Buy packaged, presliced fruits, such as melon or pineapple. · Choose 100% fruit or vegetable juice instead of soda. Limit juice intake to 4 to 6 oz (½ to ¾ cup) a day. · Blend low-fat yogurt, fruit juice, and canned or frozen fruit to make a smoothie for breakfast or a snack. Where can you learn more? Go to http://www.cannon.com/  Enter T756 in the search box to learn more about \"Eating Healthy Foods: Care Instructions. \"  Current as of: December 17, 2020               Content Version: 13.0  © 2006-2021 Healthwise, [facility]. Care instructions adapted under license by [employer] (which disclaims liability or warranty for this information). If you have questions about a medical condition or this instruction, always ask your healthcare professional. [place] [address] any warranty or liability for your use of this information.     [address],  [phone]

## 2021-12-17 NOTE — PROGRESS NOTES
HISTORY OF PRESENT ILLNESS  Leslie Pandya is a 43 y.o. female with previous Sleeve gastrectomy surgery 9 months ago. . . She has lost a total of 77.4 pounds since surgery. She  has lost 37.4 since the last ov. Body mass index is 37.61 kg/m². Bandar Glow no nausea and no vomiting . Denies Acid reflux/heartburn. . Drinking 64 ounces of water daily. 50-60  protein intake daily. +  BM's. Pt is hiking for exercise. Dietary recall -    Breakfast- oatmeal  Lunch- leftovers, chicken noodle  Dinner- meat and veg    She is snacking between meals; beef jerky and almonds. Vitamins:  MVI : yes  Calcium : yes  B-Vit 12: yes  Vit D: Yes        Ms. Luna has a reminder for a \"due or due soon\" health maintenance. I have asked that she contact her primary care provider for follow-up on this health maintenance. COMORBIDITY     SLEEP APNEA                 NO        GERD  (req.meds)            NO  HYPERLIPIDEMIA            NO  HYPERTENSION              NO         DIABETES                         NO           Current Outpatient Medications:     ergocalciferol (ERGOCALCIFEROL) 1,250 mcg (50,000 unit) capsule, TAKE 1 CAPSULE BY MOUTH EVERY 7 DAYS FOR LOW VITAMIN D LEVELS, Disp: 5 Capsule, Rfl: 2    Syringe with Needle, Disp, (Monoject 3cc Syr 25Gx1\") 3 mL 25 gauge x 1\" syrg, 1 Syringe by Does Not Apply route every thirty (30) days. , Disp: 3 Syringe, Rfl: 3    omeprazole (PRILOSEC) 40 mg capsule, TAKE 1 CAPSULE BY MOUTH EVERY DAY, Disp: 30 Capsule, Rfl: 1    calcium-cholecalciferol, d3, (CALCIUM 600 + D) 600-125 mg-unit tab, Take 1,200 mg by mouth., Disp: , Rfl:     cyclobenzaprine (FLEXERIL) 10 mg tablet, Take 1 Tab by mouth three (3) times daily as needed for Muscle Spasm(s). , Disp: 20 Tab, Rfl: 0    Lisdexamfetamine (Vyvanse) 70 mg cap, Take 70 mg by mouth daily. , Disp: , Rfl:     multivitamin (ONE A DAY) tablet, Take 1 Tab by mouth daily. , Disp: , Rfl:     levothyroxine (SYNTHROID) 88 mcg tablet, levothyroxine 88 mcg tablet TAKE 1 TABLET BY MOUTH ONCE DAILY, Disp: , Rfl:     ondansetron (ZOFRAN ODT) 4 mg disintegrating tablet, Take 1 Tab by mouth every eight (8) hours as needed for Nausea or Nausea or Vomiting (AFTER SURGERY). (Patient not taking: Reported on 7/30/2021), Disp: 20 Tab, Rfl: 0    polyethylene glycol (MIRALAX) 17 gram packet, Take 1 Packet by mouth daily. Indications: constipation (Patient not taking: Reported on 12/17/2021), Disp: 80 Packet, Rfl: 3    hyoscyamine SL (Levsin/SL) 0.125 mg SL tablet, 1 Tab by SubLINGual route every four (4) hours as needed for Cramping (CHEST PRESSURE/ SPASM AFTER SURGERY). (Patient not taking: Reported on 7/30/2021), Disp: 30 Tab, Rfl: 0      Visit Vitals  /78   Pulse 66   Temp 98.5 °F (36.9 °C) (Oral)   Resp 18   Ht 5' 5\" (1.651 m)   Wt 226 lb (102.5 kg)   SpO2 98%   BMI 37.61 kg/m²     HPI    Review of Systems   Respiratory: Negative for shortness of breath. Cardiovascular: Negative for chest pain. Gastrointestinal: Positive for nausea (when she eats too fast). Negative for abdominal pain, heartburn and vomiting. Neurological: Positive for dizziness (when she is hungry). Negative for headaches. Physical Exam  Constitutional:       Appearance: She is well-developed. HENT:      Mouth/Throat:      Mouth: Mucous membranes are moist.   Cardiovascular:      Rate and Rhythm: Normal rate and regular rhythm. Heart sounds: No murmur heard. No friction rub. No gallop. Pulmonary:      Effort: Pulmonary effort is normal.      Breath sounds: Normal breath sounds. Abdominal:      General: Bowel sounds are normal. There is no distension. Palpations: Abdomen is soft. Tenderness: There is no abdominal tenderness. Comments: No masses or hernias noted   Musculoskeletal:      Cervical back: Normal range of motion and neck supple. Skin:     General: Skin is warm and dry.    Neurological:      Mental Status: She is alert and oriented to person, place, and time.         ASSESSMENT and 295 Pankaj MEDEROS is a 43 y.o. female with previous Sleeve gastrectomy surgery 9 months ago. . . She has lost a total of 77.4 pounds since surgery. She  has lost 37.4 since the last ov. Body mass index is 37.61 kg/m². [place] St[place] no nausea and no vomiting . Denies Acid reflux/heartburn. . Drinking 64 ounces of water daily. 50-60  protein intake daily. +  BM's. Pt is hiking for exercise. Advised patient regard to diet that is high-protein, low-fat, low-sugar, limited carbohydrates. Strive for 50-60 grams of protein daily. If having a snack, foods that are protein or fiber rich. . No eating/drinking together, chew foods well, and portion control. Measure meals. Discussed snacking behavior and to [facility] pay attention to behavioral factor and habits. Drink at least 40-64 ounces of water or non-calorie/non-carbonated beverages daily. Continue vitamin regiment daily. Exercise at least 3 days a week with cardiovascular and strength training. Patient to follow up in 3 months Advised to call office if any questions/concerns. Meet with RD. Check nutrition labs    15 Minutes spent face to face with patient, >50 % of time spent counseling.

## 2022-01-28 RX ORDER — ERGOCALCIFEROL 1.25 MG/1
CAPSULE ORAL
Qty: 12 CAPSULE | Refills: 1 | Status: SHIPPED | OUTPATIENT
Start: 2022-01-28

## 2022-03-20 PROBLEM — E66.01 MORBID OBESITY WITH BMI OF 45.0-49.9, ADULT (HCC): Status: ACTIVE | Noted: 2021-03-16

## 2022-04-05 ENCOUNTER — OFFICE VISIT (OUTPATIENT)
Dept: SURGERY | Age: 37
End: 2022-04-05
Payer: COMMERCIAL

## 2022-04-05 VITALS
TEMPERATURE: 97.9 F | HEIGHT: 65 IN | WEIGHT: 225 LBS | OXYGEN SATURATION: 96 % | RESPIRATION RATE: 18 BRPM | HEART RATE: 70 BPM | BODY MASS INDEX: 37.49 KG/M2 | DIASTOLIC BLOOD PRESSURE: 71 MMHG | SYSTOLIC BLOOD PRESSURE: 103 MMHG

## 2022-04-05 DIAGNOSIS — E66.01 MORBID OBESITY (HCC): Primary | ICD-10-CM

## 2022-04-05 DIAGNOSIS — E55.9 VITAMIN D DEFICIENCY: ICD-10-CM

## 2022-04-05 DIAGNOSIS — E53.8 VITAMIN B12 DEFICIENCY: ICD-10-CM

## 2022-04-05 DIAGNOSIS — K59.00 CONSTIPATION, UNSPECIFIED CONSTIPATION TYPE: ICD-10-CM

## 2022-04-05 DIAGNOSIS — D64.9 ANEMIA, UNSPECIFIED TYPE: ICD-10-CM

## 2022-04-05 DIAGNOSIS — Z98.84 S/P LAPAROSCOPIC SLEEVE GASTRECTOMY: ICD-10-CM

## 2022-04-05 PROCEDURE — 99212 OFFICE O/P EST SF 10 MIN: CPT | Performed by: NURSE PRACTITIONER

## 2022-04-05 NOTE — PROGRESS NOTES
HISTORY OF PRESENT ILLNESS  Jamia Rosenberg is a 43 y.o. female with previous Sleeve gastrectomy 1 year ago. . She has lost a total of 78.4 pounds since surgery. She  has lost 1 lbs since the last ov. Body mass index is 37.44 kg/m². Vonne Dach no nausea and no vomiting . Denies Acid reflux/heartburn. . Drinking  64 ounces of water daily. 50+ protein intake daily. BM's, consitpatied. She is not exercising. Dietary recall -    Breakfast- scrambled egg and 1/2 banama  Lunch- salad,   Dinner- salmon,    She is not snacking between meals; trail mix. Vitamins:  MVI : yes  Calcium : yes  B-Vit 12: yes  Vit D: Yes          Ms. Luna has a reminder for a \"due or due soon\" health maintenance. I have asked that she contact her primary care provider for follow-up on this health maintenance. COMORBIDITY     SLEEP APNEA                 NO        GERD  (req.meds)            NO  HYPERLIPIDEMIA            NO  HYPERTENSION              NO         DIABETES                         NO           Current Outpatient Medications:     ergocalciferol (ERGOCALCIFEROL) 1,250 mcg (50,000 unit) capsule, TAKE 1 CAPSULE BY MOUTH ONCE EVERY 7 DAYS FOR LOW VIT D LEVELS, Disp: 12 Capsule, Rfl: 1    Syringe with Needle, Disp, (Monoject 3cc Syr 25Gx1\") 3 mL 25 gauge x 1\" syrg, 1 Syringe by Does Not Apply route every thirty (30) days. , Disp: 3 Syringe, Rfl: 3    omeprazole (PRILOSEC) 40 mg capsule, TAKE 1 CAPSULE BY MOUTH EVERY DAY, Disp: 30 Capsule, Rfl: 1    calcium-cholecalciferol, d3, (CALCIUM 600 + D) 600-125 mg-unit tab, Take 1,200 mg by mouth., Disp: , Rfl:     cyclobenzaprine (FLEXERIL) 10 mg tablet, Take 1 Tab by mouth three (3) times daily as needed for Muscle Spasm(s). , Disp: 20 Tab, Rfl: 0    Lisdexamfetamine (Vyvanse) 70 mg cap, Take 70 mg by mouth daily. , Disp: , Rfl:     multivitamin (ONE A DAY) tablet, Take 1 Tab by mouth daily. , Disp: , Rfl:     levothyroxine (SYNTHROID) 88 mcg tablet, levothyroxine 88 mcg tablet  TAKE 1 TABLET BY MOUTH ONCE DAILY, Disp: , Rfl:     ondansetron (ZOFRAN ODT) 4 mg disintegrating tablet, Take 1 Tab by mouth every eight (8) hours as needed for Nausea or Nausea or Vomiting (AFTER SURGERY). (Patient not taking: Reported on 4/5/2022), Disp: 20 Tab, Rfl: 0    polyethylene glycol (MIRALAX) 17 gram packet, Take 1 Packet by mouth daily. Indications: constipation (Patient not taking: Reported on 4/5/2022), Disp: 80 Packet, Rfl: 3    hyoscyamine SL (Levsin/SL) 0.125 mg SL tablet, 1 Tab by SubLINGual route every four (4) hours as needed for Cramping (CHEST PRESSURE/ SPASM AFTER SURGERY). (Patient not taking: Reported on 7/30/2021), Disp: 30 Tab, Rfl: 0      Visit Vitals  /71   Pulse 70   Temp 97.9 °F (36.6 °C) (Oral)   Resp 18   Ht 5' 5\" (1.651 m)   Wt 225 lb (102.1 kg)   SpO2 96%   BMI 37.44 kg/m²     HPI    Review of Systems   Respiratory: Negative for shortness of breath. Cardiovascular: Negative for chest pain. Gastrointestinal: Positive for constipation. Negative for abdominal pain, heartburn, nausea and vomiting. Neurological: Negative for dizziness and headaches. Physical Exam  Constitutional:       Appearance: She is well-developed. Cardiovascular:      Rate and Rhythm: Normal rate and regular rhythm. Heart sounds: No murmur heard. No friction rub. No gallop. Pulmonary:      Effort: Pulmonary effort is normal.      Breath sounds: Normal breath sounds. Abdominal:      General: Bowel sounds are normal. There is no distension. Palpations: Abdomen is soft. Tenderness: There is no abdominal tenderness. Comments: No masses or hernias noted   Musculoskeletal:      Cervical back: Normal range of motion. Skin:     General: Skin is warm and dry. Neurological:      Mental Status: She is alert and oriented to person, place, and time. ASSESSMENT and 295 Pankaj MEDEROS is a 43 y.o. female with previous Sleeve gastrectomy 1 year ago.   . She has lost a total of 78.4 pounds since surgery. She  has lost 1 lbs since the last ov. Body mass index is 37.44 kg/m². [place] no nausea and no vomiting . Denies Acid reflux/heartburn. . Drinking  64 ounces of water daily. 50+ protein intake daily. BM's, consitpatied. She is not exercising. Recommend contiuing Miralax, start stool softner/smooth move tea, increase fiber. Check labs. Advised patient regard to diet that is high-protein, low-fat, low-sugar, limited carbohydrates. Strive for 50-60 grams of protein daily. If having a snack, foods that are protein or fiber rich. . No eating/drinking together, chew foods well, and portion control. Measure meals. Discussed snacking behavior and to [facility] pay attention to behavioral factor and habits. Drink at least 40-64 ounces of water or non-calorie/non-carbonated beverages daily. Continue vitamin regiment daily. Exercise at least 3 days a week with cardiovascular and strength training. Patient to follow up in 6 months. Advised to call office if any questions/concerns. 15 Minutes spent face to face with patient, >50 % of time spent counseling.

## 2022-04-05 NOTE — PROGRESS NOTES
1. Have you been to the ER, urgent care clinic since your last visit? Hospitalized since your last visit? no    2. Have you seen or consulted any other health care providers outside of the [address] [place] [address] since your last visit? Include any pap smears or colon screening.  no

## 2022-04-05 NOTE — PATIENT INSTRUCTIONS
Constipation: Care Instructions  Overview     Constipation means that you have a hard time passing stools (bowel movements). People pass stools from 3 times a day to once every 3 days. What is normal for you may be different. Constipation may occur with pain in the rectum and cramping. The pain may get worse when you try to pass stools. Sometimes there are small amounts of bright red blood on toilet paper or the surface of stools. This is because of enlarged veins near the rectum (hemorrhoids). A few changes in your diet and lifestyle may help you avoid ongoing constipation. Your doctor may also prescribe medicine to help loosen your stool. Some medicines can cause constipation. These include pain medicines and antidepressants. Tell your doctor about all the medicines you take. Your doctor may want to make a medicine change to ease your symptoms. Follow-up care is a key part of your treatment and safety. Be sure to make and go to all appointments, and call your doctor if you are having problems. It's also a good idea to know your test results and keep a list of the medicines you take. How can you care for yourself at home? · Drink plenty of fluids. If you have kidney, heart, or liver disease and have to limit fluids, talk with your doctor before you increase the amount of fluids you drink. · Include high-fiber foods in your diet each day. These include fruits, vegetables, beans, and whole grains. · Get at least 30 minutes of exercise on most days of the week. Walking is a good choice. You also may want to do other activities, such as running, swimming, cycling, or playing tennis or team sports. · Take a fiber supplement, such as Citrucel or Metamucil, every day. Read and follow all instructions on the label. · Schedule time each day for a bowel movement. A daily routine may help. Take your time having a bowel movement, but don't sit for more than 10 minutes at a time.  And don't strain too much.  · Support your feet with a small step stool when you sit on the toilet. This helps flex your hips and places your pelvis in a squatting position. · Your doctor may recommend an over-the-counter laxative to relieve your constipation. Examples are Milk of Magnesia and MiraLax. Read and follow all instructions on the label. Do not use laxatives on a long-term basis. When should you call for help? Call your doctor now or seek immediate medical care if:    · You have new or worse belly pain.     · You have new or worse nausea or vomiting.     · You have blood in your stools. Watch closely for changes in your health, and be sure to contact your doctor if:    · Your constipation is getting worse.     · You do not get better as expected. Where can you learn more? Go to http://www.cannon.com/  Enter P343 in the search box to learn more about \"Constipation: Care Instructions. \"  Current as of: July 1, 2021               Content Version: 13.2  © 2006-2022 [employer]. Care instructions adapted under license by [employer] (which disclaims liability or warranty for this information). If you have questions about a medical condition or this instruction, always ask your healthcare professional. [place] [address] any warranty or liability for your use of this information.

## 2022-10-26 ENCOUNTER — OFFICE VISIT (OUTPATIENT)
Dept: SURGERY | Age: 37
End: 2022-10-26
Payer: COMMERCIAL

## 2022-10-26 VITALS
DIASTOLIC BLOOD PRESSURE: 71 MMHG | BODY MASS INDEX: 37.49 KG/M2 | HEIGHT: 65 IN | HEART RATE: 85 BPM | WEIGHT: 225 LBS | OXYGEN SATURATION: 95 % | SYSTOLIC BLOOD PRESSURE: 104 MMHG | RESPIRATION RATE: 20 BRPM | TEMPERATURE: 98 F

## 2022-10-26 DIAGNOSIS — E53.8 VITAMIN B12 DEFICIENCY: ICD-10-CM

## 2022-10-26 DIAGNOSIS — Z98.84 S/P LAPAROSCOPIC SLEEVE GASTRECTOMY: ICD-10-CM

## 2022-10-26 DIAGNOSIS — E55.9 VITAMIN D DEFICIENCY: ICD-10-CM

## 2022-10-26 DIAGNOSIS — E66.01 MORBID OBESITY (HCC): Primary | ICD-10-CM

## 2022-10-26 PROCEDURE — 99212 OFFICE O/P EST SF 10 MIN: CPT | Performed by: NURSE PRACTITIONER

## 2022-10-26 RX ORDER — CYANOCOBALAMIN 1000 UG/ML
1000 INJECTION, SOLUTION INTRAMUSCULAR; SUBCUTANEOUS ONCE
COMMUNITY
End: 2022-10-26 | Stop reason: SDUPTHER

## 2022-10-26 RX ORDER — SYRINGE W-NEEDLE,DISPOSAB,3 ML 25GX1"
1 SYRINGE, EMPTY DISPOSABLE MISCELLANEOUS
Qty: 3 EACH | Refills: 3 | Status: SHIPPED | OUTPATIENT
Start: 2022-10-26 | End: 2022-10-29

## 2022-10-26 RX ORDER — CYANOCOBALAMIN 1000 UG/ML
1000 INJECTION, SOLUTION INTRAMUSCULAR; SUBCUTANEOUS
Qty: 3 ML | Refills: 3 | Status: SHIPPED | OUTPATIENT
Start: 2022-10-26

## 2022-10-26 NOTE — PROGRESS NOTES
Identified pt with two pt identifiers (name and ). Reviewed chart in preparation for visit and have obtained necessary documentation. Darryl Conley is a 42 y.o. female  Chief Complaint   Patient presents with    Surgical Follow-up     1 year 6 months s/p lap sleeve gastrectomy down      Visit Vitals  /71   Pulse 85   Temp 98 °F (36.7 °C)   Resp 20   Ht 5' 5\" (1.651 m)   Wt 225 lb (102.1 kg)   SpO2 95%   BMI 37.44 kg/m²       1. Have you been to the ER, urgent care clinic since your last visit? Hospitalized since your last visit? yes for epotic pregnancy    2. Have you seen or consulted any other health care providers outside of the [address] [place] [address] since your last visit? Include any pap smears or colon screening.  OB/GYN

## 2022-10-26 NOTE — PROGRESS NOTES
HISTORY OF PRESENT ILLNESS  Damir Sabillon is a 42 y.o. female with previous Sleeve gastrectomy 1 and a 1/2 year ago. . She has lost a total of 78.4 pounds since surgery. She  has stayed the same since the last ov. Body mass index is 37.44 kg/m². [place] no nausea and no vomiting . Denies Acid reflux/heartburn. . Drinking  64 ounces of water daily. 50-60 protein intake daily. BM's, constipation. Using Miralax. Pt is walking and bike riding for exercise. She had an ectopic pregnancy in January and had to have her right fallopian tube removed. She states that she is finally feeling better. Dietary recall -    Breakfast- egg, oatmeal  with protein powder. Lunch- atkins meal  Dinner- salmon    She is snacking between meals; protein shake. Vitamins:  MVI : yes  Calcium : yes  B-Vit 12: yes  Vit D: Yes          Ms. Luna has a reminder for a \"due or due soon\" health maintenance. I have asked that she contact her primary care provider for follow-up on this health maintenance. COMORBIDITY     SLEEP APNEA                 NO        GERD  (req.meds)            NO  HYPERLIPIDEMIA            NO  HYPERTENSION              NO         DIABETES                         NO           Current Outpatient Medications:     cyanocobalamin (VITAMIN B12) 1,000 mcg/mL injection, 1,000 mcg by IntraMUSCular route once., Disp: , Rfl:     ergocalciferol (ERGOCALCIFEROL) 1,250 mcg (50,000 unit) capsule, TAKE 1 CAPSULE BY MOUTH ONCE EVERY 7 DAYS FOR LOW VIT D LEVELS, Disp: 12 Capsule, Rfl: 1    Syringe with Needle, Disp, (Monoject 3cc Syr 25Gx1\") 3 mL 25 gauge x 1\" syrg, 1 Syringe by Does Not Apply route every thirty (30) days. , Disp: 3 Syringe, Rfl: 3    omeprazole (PRILOSEC) 40 mg capsule, TAKE 1 CAPSULE BY MOUTH EVERY DAY, Disp: 30 Capsule, Rfl: 1    calcium-cholecalciferol, d3, (CALCIUM 600 + D) 600-125 mg-unit tab, Take 1,200 mg by mouth., Disp: , Rfl:     cyclobenzaprine (FLEXERIL) 10 mg tablet, Take 1 Tab by mouth three (3) times daily as needed for Muscle Spasm(s). , Disp: 20 Tab, Rfl: 0    ondansetron (ZOFRAN ODT) 4 mg disintegrating tablet, Take 1 Tab by mouth every eight (8) hours as needed for Nausea or Nausea or Vomiting (AFTER SURGERY). , Disp: 20 Tab, Rfl: 0    polyethylene glycol (MIRALAX) 17 gram packet, Take 1 Packet by mouth daily. Indications: constipation, Disp: 90 Packet, Rfl: 3    Lisdexamfetamine (VYVANSE) 70 mg cap, Take 70 mg by mouth daily. , Disp: , Rfl:     multivitamin (ONE A DAY) tablet, Take 1 Tab by mouth daily. , Disp: , Rfl:     levothyroxine (SYNTHROID) 88 mcg tablet, levothyroxine 88 mcg tablet  TAKE 1 TABLET BY MOUTH ONCE DAILY, Disp: , Rfl:       Visit Vitals  /71   Pulse 85   Temp 98 °F (36.7 °C)   Resp 20   Ht 5' 5\" (1.651 m)   Wt 225 lb (102.1 kg)   SpO2 95%   BMI 37.44 kg/m²      HPI    Review of Systems   Respiratory:  Negative for shortness of breath. Cardiovascular:  Negative for chest pain. Gastrointestinal:  Negative for abdominal pain, heartburn, nausea and vomiting. Neurological:  Negative for dizziness and headaches. Physical Exam  Constitutional:       Appearance: She is well-developed. HENT:      Mouth/Throat:      Mouth: Mucous membranes are moist.   Cardiovascular:      Rate and Rhythm: Normal rate and regular rhythm. Heart sounds: No murmur heard. No friction rub. No gallop. Pulmonary:      Effort: Pulmonary effort is normal.      Breath sounds: Normal breath sounds. Abdominal:      General: Bowel sounds are normal. There is no distension. Palpations: Abdomen is soft. Tenderness: There is no abdominal tenderness. Comments: No masses or hernias noted   Musculoskeletal:      Cervical back: Normal range of motion. Skin:     General: Skin is warm and dry. Neurological:      Mental Status: She is alert and oriented to person, place, and time.        ASSESSMENT and 295 [facility] is a 42 y.o. female with previous Sleeve gastrectomy 1 and a 1/2 year ago. . She has lost a total of 78.4 pounds since surgery. She  has stayed the same since the last ov. Body mass index is 37.44 kg/m². Cloteal Olivier no nausea and no vomiting . Denies Acid reflux/heartburn. . Drinking  64 ounces of water daily. 50-60 protein intake daily. BM's, constipation. Using Miralax. Pt is walking and bike riding for exercise. She had an ectopic pregnancy in January and had to have her right fallopian tube removed. She states that she is finally feeling better. Advised patient regard to diet that is high-protein, low-fat, low-sugar, limited carbohydrates. Strive for 50-60 grams of protein daily. If having a snack, foods that are protein or fiber rich. . No eating/drinking together, chew foods well, and portion control. Measure meals. Discussed snacking behavior and to [facility] pay attention to behavioral factor and habits. Drink at least 40-64 ounces of water or non-calorie/non-carbonated beverages daily. Continue vitamin regiment daily. Exercise at least 3 days a week with cardiovascular and strength training. Patient to follow up in 6 months. Advised to call office if any questions/concerns. Check vitamin D and B12 labs. Back to basic support group    15 Minutes spent face to face with patient, >50 % of time spent counseling.

## 2023-04-27 ENCOUNTER — OFFICE VISIT (OUTPATIENT)
Dept: SURGERY | Age: 38
End: 2023-04-27
Payer: COMMERCIAL

## 2023-04-27 VITALS
WEIGHT: 234 LBS | RESPIRATION RATE: 18 BRPM | BODY MASS INDEX: 38.99 KG/M2 | SYSTOLIC BLOOD PRESSURE: 112 MMHG | DIASTOLIC BLOOD PRESSURE: 71 MMHG | HEART RATE: 70 BPM | HEIGHT: 65 IN | TEMPERATURE: 97.5 F

## 2023-04-27 DIAGNOSIS — Z98.84 S/P LAPAROSCOPIC SLEEVE GASTRECTOMY: ICD-10-CM

## 2023-04-27 DIAGNOSIS — R63.5 WEIGHT GAIN: ICD-10-CM

## 2023-04-27 DIAGNOSIS — E66.01 MORBID OBESITY (HCC): Primary | ICD-10-CM

## 2023-04-27 PROCEDURE — 99212 OFFICE O/P EST SF 10 MIN: CPT | Performed by: NURSE PRACTITIONER

## 2023-04-27 NOTE — PATIENT INSTRUCTIONS
Dr. Garcia Born  [address] [place] [address] [address] [facility]    [phone] phone      Medical weight loss [address] [place] [address]  [phone]

## 2023-04-27 NOTE — PROGRESS NOTES
Identified pt with two pt identifiers (name and ). Reviewed chart in preparation for visit and have obtained necessary documentation. Mikey Franco is a 42 y.o. female  Chief Complaint   Patient presents with    Morbid Obesity     2 years and 1 month S/P Laparoscopic sleeve gastrectomy with esophagogastroduodenoscopy. Visit Vitals  /71 (BP 1 Location: Right upper arm, BP Patient Position: Sitting, BP Cuff Size: Adult)   Pulse 70   Temp 97.5 °F (36.4 °C) (Oral)   Resp 18   Ht 5' 5\" (1.651 m)   Wt 234 lb (106.1 kg)   BMI 38.94 kg/m²       1. Have you been to the ER, urgent care clinic since your last visit? Hospitalized since your last visit? No    2. Have you seen or consulted any other health care providers outside of the [address] [place] [address] since your last visit? Include any pap smears or colon screening.  Yes When: February Where: [facility] Reason for visit: Annual visit,  OBGYN

## 2023-04-27 NOTE — PROGRESS NOTES
Brennan Loza (:  1985) is a 42 y.o. F,established, here for evaluation of the following chief complaint(s): Morbid Obesity (2 years and 1 month S/P Laparoscopic sleeve gastrectomy with esophagogastroduodenoscopy, down 46 pounds, gained 9 pounds.)        SUBJECTIVE/OBJECTIVE:    HPI:  Brennan Loza is a 42 y.o. female with previous Sleeve gastrectomy surgery on 2 years and 1 months ago. . She has lost a total of 46 pounds since surgery. She   has gained 9 lbs since the last ov. Body mass index is 38.94 kg/m². Love Li 275 no nausea and no vomiting . Denies Acid reflux/heartburn. . Drinking  64 ounces of water daily. 50-60 protein intake daily. + BM's. Pt is 4 days a week and hiking for exercise. She is frustrated by her continued weight gain. Dietary recall -    Breakfast- egg or shake  Lunch- tuna and veg or greek yogurt and veg  Dinner- chicken and asparagus    She  is   snacking between meals; peppers and cuccumber and hummus. Vitamins:  MVI : yes  Calcium : yes  B-Vit 12: yes  Vit D: yes          Ms. Luna has a reminder for a \"due or due soon\" health maintenance. I have asked that she contact her primary care provider for follow-up on this health maintenance. COMORBIDITY     SLEEP APNEA                 no        GERD  (req.meds)            no  HYPERLIPIDEMIA            no  HYPERTENSION              no         DIABETES                         no           Current Outpatient Medications:     Lisdexamfetamine (VYVANSE) 70 mg cap, Take 1 Capsule by mouth daily. , Disp: , Rfl:     cyanocobalamin (VITAMIN B12) 1,000 mcg/mL injection, 1 ML BY INTRAMUSCULAR ROUTE EVERY THIRTY (30) DAYS. (Patient not taking: Reported on 2023), Disp: 3 mL, Rfl: 3    ergocalciferol (ERGOCALCIFEROL) 1,250 mcg (50,000 unit) capsule, TAKE 1 CAPSULE BY MOUTH ONCE EVERY 7 DAYS FOR LOW VIT D LEVELS (Patient not taking: Reported on 2023), Disp: 12 Capsule, Rfl: 1    omeprazole (PRILOSEC) 40 mg capsule, TAKE 1 CAPSULE BY MOUTH EVERY DAY (Patient not taking: Reported on 4/27/2023), Disp: 30 Capsule, Rfl: 1    calcium-cholecalciferol, d3, (CALCIUM 600 + D) 600-125 mg-unit tab, Take 1,200 mg by mouth. (Patient not taking: Reported on 4/27/2023), Disp: , Rfl:     cyclobenzaprine (FLEXERIL) 10 mg tablet, Take 1 Tab by mouth three (3) times daily as needed for Muscle Spasm(s). (Patient not taking: Reported on 4/27/2023), Disp: 20 Tab, Rfl: 0    ondansetron (ZOFRAN ODT) 4 mg disintegrating tablet, Take 1 Tab by mouth every eight (8) hours as needed for Nausea or Nausea or Vomiting (AFTER SURGERY). (Patient not taking: Reported on 4/27/2023), Disp: 20 Tab, Rfl: 0    polyethylene glycol (MIRALAX) 17 gram packet, Take 1 Packet by mouth daily. Indications: constipation (Patient not taking: Reported on 4/27/2023), Disp: 80 Packet, Rfl: 3    multivitamin (ONE A DAY) tablet, Take 1 Tab by mouth daily. (Patient not taking: Reported on 4/27/2023), Disp: , Rfl:     levothyroxine (SYNTHROID) 88 mcg tablet, levothyroxine 88 mcg tablet  TAKE 1 TABLET BY MOUTH ONCE DAILY (Patient not taking: Reported on 4/27/2023), Disp: , Rfl:       Visit Vitals  /71 (BP 1 Location: Right upper arm, BP Patient Position: Sitting, BP Cuff Size: Adult)   Pulse 70   Temp 97.5 °F (36.4 °C) (Oral)   Resp 18   Ht 5' 5\" (1.651 m)   Wt 234 lb (106.1 kg)   BMI 38.94 kg/m²      Review of Systems   Respiratory:  Negative for shortness of breath. Cardiovascular:  Negative for chest pain. Gastrointestinal:  Negative for abdominal pain, nausea and vomiting. Neurological:  Negative for dizziness and headaches. ASSESSMENT/PLAN:    Physical Exam  HENT:      Mouth/Throat:      Mouth: Mucous membranes are moist.   Pulmonary:      Effort: No respiratory distress. Abdominal:      Tenderness: There is no abdominal tenderness. Comments: No masses or hernias noted   Neurological:      Mental Status: She is alert and oriented to person, place, and time.        1. Morbid obesity ([facility] [address]  2. BMI 38.0-38.9,adult  3. S/P laparoscopic sleeve gastrectomy  4. Weight gain        Will refer to medical weight loss   Meet with ODILON. She had labs done by her Rheumatologist which were good. Advised patient regard to diet that is high-protein, low-fat, low-sugar, limited carbohydrates. Strive for 50-60 grams of protein daily. If having a snack, foods that are protein or fiber rich. . No eating/drinking together, chew foods well, and portion control. Measure meals. Discussed snacking behavior and to [facility] pay attention to behavioral factor and habits. Drink at least 40-64 ounces of water or non-calorie/non-carbonated beverages daily. Continue vitamin regiment daily. Exercise at least 3 days a week with cardiovascular and strength training. Patient to follow up in yearly. Advised to call office if any questions/concerns. 15 Minutes spent face to face with patient, >50 % of time spent counseling. No orders of the defined types were placed in this encounter. An electronic signature was used to authenticate this note.     --@[email or web address]@

## 2023-05-25 RX ORDER — LEVOTHYROXINE SODIUM 88 UG/1
TABLET ORAL
COMMUNITY

## 2023-05-25 RX ORDER — ERGOCALCIFEROL 1.25 MG/1
CAPSULE ORAL
COMMUNITY
Start: 2022-01-28

## 2023-05-25 RX ORDER — OMEPRAZOLE 40 MG/1
1 CAPSULE, DELAYED RELEASE ORAL DAILY
COMMUNITY
Start: 2021-07-13

## 2023-05-25 RX ORDER — ONDANSETRON 4 MG/1
4 TABLET, ORALLY DISINTEGRATING ORAL EVERY 8 HOURS PRN
COMMUNITY
Start: 2021-02-23

## 2023-05-25 RX ORDER — CYANOCOBALAMIN 1000 UG/ML
1000 INJECTION, SOLUTION INTRAMUSCULAR; SUBCUTANEOUS
COMMUNITY
Start: 2023-04-13

## 2023-05-25 RX ORDER — POLYETHYLENE GLYCOL 3350 17 G/17G
17 POWDER, FOR SOLUTION ORAL DAILY
COMMUNITY
Start: 2021-02-23

## 2023-05-25 RX ORDER — CYCLOBENZAPRINE HCL 10 MG
10 TABLET ORAL 3 TIMES DAILY PRN
COMMUNITY
Start: 2021-03-30

## 2023-09-02 ENCOUNTER — HOSPITAL ENCOUNTER (EMERGENCY)
Facility: HOSPITAL | Age: 38
Discharge: HOME | End: 2023-09-02
Attending: EMERGENCY MEDICINE
Payer: COMMERCIAL

## 2023-09-02 ENCOUNTER — APPOINTMENT (OUTPATIENT)
Facility: HOSPITAL | Age: 38
End: 2023-09-02
Payer: COMMERCIAL

## 2023-09-02 VITALS
HEART RATE: 62 BPM | BODY MASS INDEX: 34.82 KG/M2 | SYSTOLIC BLOOD PRESSURE: 107 MMHG | OXYGEN SATURATION: 97 % | DIASTOLIC BLOOD PRESSURE: 70 MMHG | WEIGHT: 209.22 LBS | RESPIRATION RATE: 18 BRPM | TEMPERATURE: 97.4 F

## 2023-09-02 DIAGNOSIS — R10.13 ABDOMINAL PAIN, EPIGASTRIC: Primary | ICD-10-CM

## 2023-09-02 LAB
ALBUMIN SERPL-MCNC: 3.8 G/DL (ref 3.5–5)
ALBUMIN/GLOB SERPL: 1.2 (ref 1.1–2.2)
ALP SERPL-CCNC: 41 U/L (ref 45–117)
ALT SERPL-CCNC: 17 U/L (ref 12–78)
ANION GAP SERPL CALC-SCNC: 4 MMOL/L (ref 5–15)
APPEARANCE UR: ABNORMAL
AST SERPL-CCNC: 11 U/L (ref 15–37)
BACTERIA URNS QL MICRO: NEGATIVE /HPF
BASOPHILS # BLD: 0.1 K/UL (ref 0–0.1)
BASOPHILS NFR BLD: 1 % (ref 0–1)
BILIRUB SERPL-MCNC: 0.4 MG/DL (ref 0.2–1)
BILIRUB UR QL: NEGATIVE
BUN SERPL-MCNC: 12 MG/DL (ref 6–20)
BUN/CREAT SERPL: 9 (ref 12–20)
CALCIUM SERPL-MCNC: 9 MG/DL (ref 8.5–10.1)
CHLORIDE SERPL-SCNC: 108 MMOL/L (ref 97–108)
CO2 SERPL-SCNC: 27 MMOL/L (ref 21–32)
COLOR UR: ABNORMAL
COMMENT:: NORMAL
CREAT SERPL-MCNC: 1.33 MG/DL (ref 0.55–1.02)
DIFFERENTIAL METHOD BLD: NORMAL
EOSINOPHIL # BLD: 0.1 K/UL (ref 0–0.4)
EOSINOPHIL NFR BLD: 1 % (ref 0–7)
EPITH CASTS URNS QL MICRO: ABNORMAL /LPF
ERYTHROCYTE [DISTWIDTH] IN BLOOD BY AUTOMATED COUNT: 11.9 % (ref 11.5–14.5)
GLOBULIN SER CALC-MCNC: 3.3 G/DL (ref 2–4)
GLUCOSE SERPL-MCNC: 97 MG/DL (ref 65–100)
GLUCOSE UR STRIP.AUTO-MCNC: NEGATIVE MG/DL
HCG UR QL: NEGATIVE
HCT VFR BLD AUTO: 40.8 % (ref 35–47)
HGB BLD-MCNC: 14 G/DL (ref 11.5–16)
HGB UR QL STRIP: ABNORMAL
IMM GRANULOCYTES # BLD AUTO: 0 K/UL (ref 0–0.04)
IMM GRANULOCYTES NFR BLD AUTO: 0 % (ref 0–0.5)
KETONES UR QL STRIP.AUTO: NEGATIVE MG/DL
LEUKOCYTE ESTERASE UR QL STRIP.AUTO: NEGATIVE
LIPASE SERPL-CCNC: 227 U/L (ref 73–393)
LYMPHOCYTES # BLD: 2.4 K/UL (ref 0.8–3.5)
LYMPHOCYTES NFR BLD: 30 % (ref 12–49)
MCH RBC QN AUTO: 29.9 PG (ref 26–34)
MCHC RBC AUTO-ENTMCNC: 34.3 G/DL (ref 30–36.5)
MCV RBC AUTO: 87 FL (ref 80–99)
MONOCYTES # BLD: 0.4 K/UL (ref 0–1)
MONOCYTES NFR BLD: 5 % (ref 5–13)
NEUTS SEG # BLD: 5.2 K/UL (ref 1.8–8)
NEUTS SEG NFR BLD: 63 % (ref 32–75)
NITRITE UR QL STRIP.AUTO: NEGATIVE
NRBC # BLD: 0 K/UL (ref 0–0.01)
NRBC BLD-RTO: 0 PER 100 WBC
PH UR STRIP: 5.5 (ref 5–8)
PLATELET # BLD AUTO: 316 K/UL (ref 150–400)
PMV BLD AUTO: 10.8 FL (ref 8.9–12.9)
POTASSIUM SERPL-SCNC: 3.4 MMOL/L (ref 3.5–5.1)
PROT SERPL-MCNC: 7.1 G/DL (ref 6.4–8.2)
PROT UR STRIP-MCNC: 30 MG/DL
RBC # BLD AUTO: 4.69 M/UL (ref 3.8–5.2)
RBC #/AREA URNS HPF: >100 /HPF (ref 0–5)
SODIUM SERPL-SCNC: 139 MMOL/L (ref 136–145)
SP GR UR REFRACTOMETRY: 1.02 (ref 1–1.03)
SPECIMEN HOLD: NORMAL
SPECIMEN HOLD: NORMAL
UROBILINOGEN UR QL STRIP.AUTO: 0.2 EU/DL (ref 0.2–1)
WBC # BLD AUTO: 8.1 K/UL (ref 3.6–11)
WBC URNS QL MICRO: ABNORMAL /HPF (ref 0–4)

## 2023-09-02 PROCEDURE — 2580000003 HC RX 258

## 2023-09-02 PROCEDURE — 36415 COLL VENOUS BLD VENIPUNCTURE: CPT

## 2023-09-02 PROCEDURE — 99285 EMERGENCY DEPT VISIT HI MDM: CPT

## 2023-09-02 PROCEDURE — 80053 COMPREHEN METABOLIC PANEL: CPT

## 2023-09-02 PROCEDURE — 85025 COMPLETE CBC W/AUTO DIFF WBC: CPT

## 2023-09-02 PROCEDURE — 6370000000 HC RX 637 (ALT 250 FOR IP)

## 2023-09-02 PROCEDURE — 6360000002 HC RX W HCPCS

## 2023-09-02 PROCEDURE — 96375 TX/PRO/DX INJ NEW DRUG ADDON: CPT

## 2023-09-02 PROCEDURE — 81025 URINE PREGNANCY TEST: CPT

## 2023-09-02 PROCEDURE — A4216 STERILE WATER/SALINE, 10 ML: HCPCS

## 2023-09-02 PROCEDURE — 96361 HYDRATE IV INFUSION ADD-ON: CPT

## 2023-09-02 PROCEDURE — 2500000003 HC RX 250 WO HCPCS

## 2023-09-02 PROCEDURE — 6360000004 HC RX CONTRAST MEDICATION

## 2023-09-02 PROCEDURE — 81001 URINALYSIS AUTO W/SCOPE: CPT

## 2023-09-02 PROCEDURE — 96374 THER/PROPH/DIAG INJ IV PUSH: CPT

## 2023-09-02 PROCEDURE — 83690 ASSAY OF LIPASE: CPT

## 2023-09-02 PROCEDURE — 74177 CT ABD & PELVIS W/CONTRAST: CPT

## 2023-09-02 RX ORDER — 0.9 % SODIUM CHLORIDE 0.9 %
1000 INTRAVENOUS SOLUTION INTRAVENOUS ONCE
Status: COMPLETED | OUTPATIENT
Start: 2023-09-02 | End: 2023-09-02

## 2023-09-02 RX ORDER — KETOROLAC TROMETHAMINE 30 MG/ML
30 INJECTION, SOLUTION INTRAMUSCULAR; INTRAVENOUS
Status: COMPLETED | OUTPATIENT
Start: 2023-09-02 | End: 2023-09-02

## 2023-09-02 RX ORDER — ONDANSETRON 4 MG/1
4 TABLET, ORALLY DISINTEGRATING ORAL 3 TIMES DAILY PRN
Qty: 15 TABLET | Refills: 0 | Status: SHIPPED | OUTPATIENT
Start: 2023-09-02 | End: 2023-09-07

## 2023-09-02 RX ORDER — FAMOTIDINE 20 MG/1
20 TABLET, FILM COATED ORAL 2 TIMES DAILY
Qty: 60 TABLET | Refills: 0 | Status: SHIPPED | OUTPATIENT
Start: 2023-09-02 | End: 2023-10-02

## 2023-09-02 RX ORDER — ONDANSETRON 2 MG/ML
4 INJECTION INTRAMUSCULAR; INTRAVENOUS ONCE
Status: COMPLETED | OUTPATIENT
Start: 2023-09-02 | End: 2023-09-02

## 2023-09-02 RX ADMIN — Medication 40 ML: at 20:27

## 2023-09-02 RX ADMIN — SODIUM CHLORIDE 1000 ML: 9 INJECTION, SOLUTION INTRAVENOUS at 17:10

## 2023-09-02 RX ADMIN — IOPAMIDOL 100 ML: 755 INJECTION, SOLUTION INTRAVENOUS at 18:48

## 2023-09-02 RX ADMIN — KETOROLAC TROMETHAMINE 30 MG: 30 INJECTION, SOLUTION INTRAMUSCULAR at 17:10

## 2023-09-02 RX ADMIN — FAMOTIDINE 20 MG: 10 INJECTION INTRAVENOUS at 20:28

## 2023-09-02 RX ADMIN — ONDANSETRON 4 MG: 2 INJECTION INTRAMUSCULAR; INTRAVENOUS at 17:10

## 2023-09-02 ASSESSMENT — PAIN SCALES - GENERAL
PAINLEVEL_OUTOF10: 7
PAINLEVEL_OUTOF10: 6

## 2023-09-02 ASSESSMENT — ENCOUNTER SYMPTOMS
DIARRHEA: 0
NAUSEA: 1
SHORTNESS OF BREATH: 0
VOMITING: 1
ABDOMINAL PAIN: 1
BACK PAIN: 0

## 2023-09-02 ASSESSMENT — PAIN - FUNCTIONAL ASSESSMENT: PAIN_FUNCTIONAL_ASSESSMENT: 0-10

## 2023-09-02 ASSESSMENT — PAIN DESCRIPTION - LOCATION
LOCATION: ABDOMEN
LOCATION: ABDOMEN

## 2023-09-02 ASSESSMENT — PAIN DESCRIPTION - DESCRIPTORS
DESCRIPTORS: BURNING
DESCRIPTORS: BURNING

## 2023-09-02 ASSESSMENT — PAIN DESCRIPTION - ORIENTATION
ORIENTATION: UPPER
ORIENTATION: UPPER

## 2023-09-02 NOTE — ED PROVIDER NOTES
[facility] EMERGENCY DEP  EMERGENCY DEPARTMENT ENCOUNTER      Pt Name: Cuauhtemoc Shaikh  MRN: [id number]  [address] [facility] [place] 1985  Date of evaluation: 9/2/2023  Provider: PIERO Lamb - CARLA    [address] [facility]       Chief Complaint   Patient presents with    Abdominal Pain         HISTORY OF PRESENT ILLNESS   (Location/Symptom, Timing/Onset, Context/Setting, Quality, Duration, Modifying Factors, Severity)  Note limiting factors. Cuauhtemoc Shaikh is a 34 y.o. female presenting to the ED c/o decreased appetite, abdominal pain, heart burn, nausea, vomiting intermittently for the past few days (X 2-3 days). Pt reports she was dx w/ UTI when she had similar issue back in July. Diarrhea on Thursday but has since subsided. Pt reports taking ibuprofen today at 7:30 AM and a dose of bactrim at 5 PM. Pt reports going to urologist on Wednesday so she was prescribed bactrim for the UTI. Denies fever, dysuria, hematuria, pregnancy. The history is provided by the patient. No  was used. Review of External Medical Records:     Nursing Notes were reviewed. REVIEW OF SYSTEMS    (2-9 systems for level 4, 10 or more for level 5)     Review of Systems   Constitutional:  Positive for appetite change and chills. Negative for activity change and fever. Respiratory:  Negative for shortness of breath. Cardiovascular:  Negative for chest pain. Gastrointestinal:  Positive for abdominal pain, nausea and vomiting. Negative for diarrhea. Genitourinary:  Negative for decreased urine volume, difficulty urinating and dysuria. Musculoskeletal:  Negative for back pain. Skin:  Negative for rash. All other systems reviewed and are negative. Except as noted above the remainder of the review of systems was reviewed and negative.        PAST MEDICAL HISTORY     Past Medical History:   Diagnosis Date    Chlamydia     Hirsutism     History of PCOS     Hypothyroidism     Joint pain     juan. knees    Motion

## 2023-09-02 NOTE — ED TRIAGE NOTES
Patient presents from home with complaints of abdominal pain, nausea, and decreased appetite since Thursday the 31st. Patient reports she was diagnosed with a UTI in July and reports her stomach was upset by the antibiotic

## 2023-09-03 NOTE — ED NOTES
Discharge instructions provided. Pt was given copy of discharge instructions with 0 paper script(s) and 2 electronic script(s). Pt verbalized understanding of the medication instructions, and the importance of following up as recommended by EDP. Pt has no further questions at this time. Wheelchair offered from treatment area to hospital entrance, pt declined. Pt leaving ED ambulatory and in stable condition.         Marion Oconnor RN  09/02/23 [id number]

## 2023-09-03 NOTE — DISCHARGE INSTRUCTIONS
Take the pepcid to to help with the abdominal discomfort. Stop taking prilosec at this time. Take zofran-odt as needed for nausea and vomiting. Place the tablet on your tongue to dissolve when you feel nauseous. Follow-up with your PCP and GI provider in 3-4 days for reevaluation.  Call for appointment as soon as possible

## 2023-12-08 ENCOUNTER — TELEPHONE (OUTPATIENT)
Age: 38
End: 2023-12-08

## 2023-12-08 NOTE — TELEPHONE ENCOUNTER
Spoke with Hetal at [facility] requesting pts last OV notes, recent labs and recent EKG  to be faxed to [phone]. Best call back number [phone]

## 2023-12-08 NOTE — TELEPHONE ENCOUNTER
Madeleine Rivas, I do not see where she has ever been seen at our office... And I do not recall her name as a Dr. Jacobs patient from her previous practice.

## 2024-02-26 ENCOUNTER — TELEPHONE (OUTPATIENT)
Age: 39
End: 2024-02-26

## 2024-02-26 NOTE — TELEPHONE ENCOUNTER
LM for pt to call and schedule annual bariatric exam. Letter sent. [facility]

## 2024-06-14 RX ORDER — CYANOCOBALAMIN 1000 UG/ML
INJECTION, SOLUTION INTRAMUSCULAR; SUBCUTANEOUS
Qty: 3 ML | Refills: 2 | Status: SHIPPED | OUTPATIENT
Start: 2024-06-14

## 2024-10-14 ENCOUNTER — APPOINTMENT (OUTPATIENT)
Facility: HOSPITAL | Age: 39
End: 2024-10-14
Payer: COMMERCIAL

## 2024-10-14 ENCOUNTER — HOSPITAL ENCOUNTER (EMERGENCY)
Facility: HOSPITAL | Age: 39
Discharge: HOME | End: 2024-10-14
Attending: EMERGENCY MEDICINE
Payer: COMMERCIAL

## 2024-10-14 VITALS
BODY MASS INDEX: 35.59 KG/M2 | RESPIRATION RATE: 14 BRPM | TEMPERATURE: 97.9 F | SYSTOLIC BLOOD PRESSURE: 112 MMHG | WEIGHT: 213.63 LBS | HEIGHT: 65 IN | DIASTOLIC BLOOD PRESSURE: 67 MMHG | OXYGEN SATURATION: 95 % | HEART RATE: 71 BPM

## 2024-10-14 DIAGNOSIS — R10.9 ABDOMINAL PAIN, UNSPECIFIED ABDOMINAL LOCATION: ICD-10-CM

## 2024-10-14 DIAGNOSIS — K59.00 CONSTIPATION, UNSPECIFIED CONSTIPATION TYPE: Primary | ICD-10-CM

## 2024-10-14 LAB
ALBUMIN SERPL-MCNC: 3.6 G/DL (ref 3.5–5)
ALBUMIN/GLOB SERPL: 1.2 (ref 1.1–2.2)
ALP SERPL-CCNC: 55 U/L (ref 45–117)
ALT SERPL-CCNC: 16 U/L (ref 12–78)
ANION GAP SERPL CALC-SCNC: 5 MMOL/L (ref 2–12)
APPEARANCE UR: CLEAR
AST SERPL-CCNC: 8 U/L (ref 15–37)
BACTERIA URNS QL MICRO: NEGATIVE /HPF
BASOPHILS # BLD: 0 K/UL (ref 0–0.1)
BASOPHILS NFR BLD: 0 % (ref 0–1)
BILIRUB SERPL-MCNC: 0.3 MG/DL (ref 0.2–1)
BILIRUB UR QL: NEGATIVE
BUN SERPL-MCNC: 20 MG/DL (ref 6–20)
BUN/CREAT SERPL: 17 (ref 12–20)
CALCIUM SERPL-MCNC: 8.9 MG/DL (ref 8.5–10.1)
CHLORIDE SERPL-SCNC: 107 MMOL/L (ref 97–108)
CO2 SERPL-SCNC: 28 MMOL/L (ref 21–32)
COLOR UR: ABNORMAL
COMMENT:: NORMAL
CREAT SERPL-MCNC: 1.16 MG/DL (ref 0.55–1.02)
DIFFERENTIAL METHOD BLD: NORMAL
EOSINOPHIL # BLD: 0.1 K/UL (ref 0–0.4)
EOSINOPHIL NFR BLD: 1 % (ref 0–7)
EPITH CASTS URNS QL MICRO: ABNORMAL /LPF
ERYTHROCYTE [DISTWIDTH] IN BLOOD BY AUTOMATED COUNT: 12.2 % (ref 11.5–14.5)
GLOBULIN SER CALC-MCNC: 3.1 G/DL (ref 2–4)
GLUCOSE SERPL-MCNC: 76 MG/DL (ref 65–100)
GLUCOSE UR STRIP.AUTO-MCNC: NEGATIVE MG/DL
HCG UR QL: NEGATIVE
HCT VFR BLD AUTO: 37.8 % (ref 35–47)
HGB BLD-MCNC: 12.9 G/DL (ref 11.5–16)
HGB UR QL STRIP: NEGATIVE
HYALINE CASTS URNS QL MICRO: ABNORMAL /LPF (ref 0–5)
IMM GRANULOCYTES # BLD AUTO: 0 K/UL (ref 0–0.04)
IMM GRANULOCYTES NFR BLD AUTO: 0 % (ref 0–0.5)
KETONES UR QL STRIP.AUTO: ABNORMAL MG/DL
LEUKOCYTE ESTERASE UR QL STRIP.AUTO: NEGATIVE
LYMPHOCYTES # BLD: 2.8 K/UL (ref 0.8–3.5)
LYMPHOCYTES NFR BLD: 35 % (ref 12–49)
MCH RBC QN AUTO: 30.8 PG (ref 26–34)
MCHC RBC AUTO-ENTMCNC: 34.1 G/DL (ref 30–36.5)
MCV RBC AUTO: 90.2 FL (ref 80–99)
MONOCYTES # BLD: 0.4 K/UL (ref 0–1)
MONOCYTES NFR BLD: 5 % (ref 5–13)
NEUTS SEG # BLD: 4.8 K/UL (ref 1.8–8)
NEUTS SEG NFR BLD: 59 % (ref 32–75)
NITRITE UR QL STRIP.AUTO: NEGATIVE
NRBC # BLD: 0 K/UL (ref 0–0.01)
NRBC BLD-RTO: 0 PER 100 WBC
PH UR STRIP: 5.5 (ref 5–8)
PLATELET # BLD AUTO: 221 K/UL (ref 150–400)
PMV BLD AUTO: 10.7 FL (ref 8.9–12.9)
POTASSIUM SERPL-SCNC: 4.2 MMOL/L (ref 3.5–5.1)
PROT SERPL-MCNC: 6.7 G/DL (ref 6.4–8.2)
PROT UR STRIP-MCNC: NEGATIVE MG/DL
RBC # BLD AUTO: 4.19 M/UL (ref 3.8–5.2)
RBC #/AREA URNS HPF: ABNORMAL /HPF (ref 0–5)
SODIUM SERPL-SCNC: 140 MMOL/L (ref 136–145)
SP GR UR REFRACTOMETRY: 1.02 (ref 1–1.03)
SPECIMEN HOLD: NORMAL
URINE CULTURE IF INDICATED: ABNORMAL
UROBILINOGEN UR QL STRIP.AUTO: 0.2 EU/DL (ref 0.2–1)
WBC # BLD AUTO: 8.1 K/UL (ref 3.6–11)
WBC URNS QL MICRO: ABNORMAL /HPF (ref 0–4)

## 2024-10-14 PROCEDURE — 6360000004 HC RX CONTRAST MEDICATION: Performed by: RADIOLOGY

## 2024-10-14 PROCEDURE — 36415 COLL VENOUS BLD VENIPUNCTURE: CPT

## 2024-10-14 PROCEDURE — 81001 URINALYSIS AUTO W/SCOPE: CPT

## 2024-10-14 PROCEDURE — 74177 CT ABD & PELVIS W/CONTRAST: CPT

## 2024-10-14 PROCEDURE — 85025 COMPLETE CBC W/AUTO DIFF WBC: CPT

## 2024-10-14 PROCEDURE — 80053 COMPREHEN METABOLIC PANEL: CPT

## 2024-10-14 PROCEDURE — 87491 CHLMYD TRACH DNA AMP PROBE: CPT

## 2024-10-14 PROCEDURE — 87591 N.GONORRHOEAE DNA AMP PROB: CPT

## 2024-10-14 PROCEDURE — 81025 URINE PREGNANCY TEST: CPT

## 2024-10-14 PROCEDURE — 99285 EMERGENCY DEPT VISIT HI MDM: CPT

## 2024-10-14 RX ORDER — IOPAMIDOL 755 MG/ML
100 INJECTION, SOLUTION INTRAVASCULAR
Status: COMPLETED | OUTPATIENT
Start: 2024-10-14 | End: 2024-10-14

## 2024-10-14 RX ORDER — POLYETHYLENE GLYCOL 3350 17 G/17G
17 POWDER, FOR SOLUTION ORAL 2 TIMES DAILY
Qty: 578 G | Refills: 0 | Status: SHIPPED | OUTPATIENT
Start: 2024-10-14 | End: 2024-10-28

## 2024-10-14 RX ADMIN — IOPAMIDOL 100 ML: 755 INJECTION, SOLUTION INTRAVENOUS at 21:39

## 2024-10-14 ASSESSMENT — PAIN DESCRIPTION - LOCATION: LOCATION: ABDOMEN;BACK

## 2024-10-14 ASSESSMENT — PAIN DESCRIPTION - PAIN TYPE: TYPE: ACUTE PAIN

## 2024-10-14 ASSESSMENT — PAIN - FUNCTIONAL ASSESSMENT
PAIN_FUNCTIONAL_ASSESSMENT: 0-10
PAIN_FUNCTIONAL_ASSESSMENT: ACTIVITIES ARE NOT PREVENTED

## 2024-10-14 ASSESSMENT — PAIN DESCRIPTION - ONSET: ONSET: ON-GOING

## 2024-10-14 ASSESSMENT — PAIN DESCRIPTION - FREQUENCY: FREQUENCY: CONTINUOUS

## 2024-10-14 ASSESSMENT — PAIN DESCRIPTION - ORIENTATION: ORIENTATION: LOWER

## 2024-10-14 ASSESSMENT — PAIN DESCRIPTION - DESCRIPTORS: DESCRIPTORS: ACHING

## 2024-10-14 ASSESSMENT — PAIN SCALES - GENERAL: PAINLEVEL_OUTOF10: 8

## 2024-10-15 NOTE — ED TRIAGE NOTES
Patient arrives ambulatory with c/o lower back and \"ovary\" pain for the past few days. LMP was about two weeks ago. Has tried ibuprofen at home with little relief. Hx ovarian cysts and ectopic pregnancy. Has not tested for pregnancy yet.

## 2024-10-15 NOTE — ED NOTES
Patient signed out to me by Dr. Roth at 9p  39 y.o. female here with back and lower abdominal pain.  Feels similar to prior ectopic so concerned about this.    Plan for labs, hcg. If neg hcg get CT, if + then US.    Hcg neg-->CT ordered  Cmp unremarkable  Cbc ok      GC chlamydia sent--pt with slight vaginal bleeding, low concern for STI  UA neg    CT abd with severe constipation--miralax started    DO Florentino Whittington, Clarence MAY DO  10/14/24 [id number]

## 2024-10-15 NOTE — ED PROVIDER NOTES
[facility] EMERGENCY DEP  EMERGENCY DEPARTMENT ENCOUNTER      Pt Name: Jannette Elliott  MRN: [id number]  Birthdate 1985  Date of evaluation: 10/14/2024  Provider: Kai Roth MD      HISTORY OF PRESENT ILLNESS      39 year old female with history of PCOS, ectopic pregnancy presents to the ED with chief complaint of lower back pain and lower abdominal pain. She feels the pain is in her ovaries, may be somewhat similar to when she had ectopic pregnancy 2022 treated surgically. Some vaginal spotting yesterday and LMP about 3 weeks ago.    The history is provided by the patient and medical records.           Nursing Notes were reviewed.    REVIEW OF SYSTEMS         Review of Systems        PAST MEDICAL HISTORY     Past Medical History:   Diagnosis Date    Chlamydia     Hirsutism     History of PCOS     Hypothyroidism     Joint pain     juan. knees    Motion sickness     Pap smear for cervical cancer screening 6/26/14    negative hpv negative    Pap smear for cervical cancer screening 1/24/12    negative    Psychiatric disorder     ANXIETY         SURGICAL HISTORY       Past Surgical History:   Procedure Laterality Date    DILATION AND CURETTAGE OF UTERUS      GASTRECTOMY  03/16/2021    Laparoscopic sleeve gastrectomy by Dr. Clarke Young.    HEENT      wisdom teeth removed         CURRENT MEDICATIONS       Previous Medications    CALCIUM CARBONATE-VITAMIN D 600-3.125 MG-MCG TABS    Take 1,200 mg by mouth    CYANOCOBALAMIN 1000 MCG/ML INJECTION    INJECT 1 ML BY INTRAMUSCULAR ROUTE EVERY THIRTY (30) DAYS.    CYCLOBENZAPRINE (FLEXERIL) 10 MG TABLET    Take 1 tablet by mouth 3 times daily as needed    ERGOCALCIFEROL (ERGOCALCIFEROL) 1.25 MG (78408 UT) CAPSULE    TAKE 1 CAPSULE BY MOUTH ONCE EVERY 7 DAYS FOR LOW VIT D LEVELS    FAMOTIDINE (PEPCID) 20 MG TABLET    Take 1 tablet by mouth 2 times daily    LEVOTHYROXINE (SYNTHROID) 88 MCG TABLET    levothyroxine 88 mcg tablet   TAKE 1 TABLET BY MOUTH ONCE DAILY

## 2024-10-16 LAB
C TRACH DNA SPEC QL NAA+PROBE: NEGATIVE
N GONORRHOEA DNA SPEC QL NAA+PROBE: NEGATIVE
SAMPLE TYPE: NORMAL
SERVICE CMNT-IMP: NORMAL
SPECIMEN SOURCE: NORMAL

## 2025-02-26 ENCOUNTER — TELEPHONE (OUTPATIENT)
Age: 40
End: 2025-02-26

## 2025-02-26 NOTE — TELEPHONE ENCOUNTER
LM for pt to call and schedule annual bariatric exam.  [employer]ifi message also sent. Letter sent. [facility]

## 2025-03-04 RX ORDER — CYANOCOBALAMIN 1000 UG/ML
INJECTION, SOLUTION INTRAMUSCULAR; SUBCUTANEOUS
Qty: 3 ML | Refills: 2 | OUTPATIENT
Start: 2025-03-04

## (undated) DEVICE — INFECTION CONTROL KIT SYS

## (undated) DEVICE — PREP SKN CHLRAPRP APL 26ML STR --

## (undated) DEVICE — SUTURE DEV SZ 2-0 WND CLSR ABSRB GS-22 VLOC COVIDIEN VLOCM2145

## (undated) DEVICE — ENDO CARRY-ON PROCEDURE KIT INCLUDES ENZYMATIC SPONGE, GAUZE, BIOHAZARD LABEL, TRAY, LUBRICANT, DIRTY SCOPE LABEL, WATER LABEL, TRAY, DRAWSTRING PAD, AND DEFENDO 4-PIECE KIT.: Brand: ENDO CARRY-ON PROCEDURE KIT

## (undated) DEVICE — STERILE POLYISOPRENE POWDER-FREE SURGICAL GLOVES WITH EMOLLIENT COATING: Brand: PROTEXIS

## (undated) DEVICE — Device

## (undated) DEVICE — LAPAROSCOPIC TROCAR SLEEVE/SINGLE USE: Brand: KII® OPTICAL ACCESS SYSTEM

## (undated) DEVICE — GARMENT,MEDLINE,DVT,INT,CALF,MED, GEN2: Brand: MEDLINE

## (undated) DEVICE — SUTURE MCRYL SZ 4-0 L27IN ABSRB UD L24MM PS-1 3/8 CIR PRIM Y935H

## (undated) DEVICE — GOWN,SIRUS,FABRNF,XL,20/CS: Brand: MEDLINE

## (undated) DEVICE — TROCARS: Brand: KII® OPTICAL ACCESS SYSTEM

## (undated) DEVICE — REM POLYHESIVE ADULT PATIENT RETURN ELECTRODE: Brand: VALLEYLAB

## (undated) DEVICE — AIR SHEET,LAT,COMFORT GLIDE, BLEND 40X80: Brand: MEDLINE

## (undated) DEVICE — SYR 50ML LR LCK 1ML GRAD NSAF --

## (undated) DEVICE — SOL IRR SOD CL 0.9% 1000ML BTL --

## (undated) DEVICE — TUBING, SUCTION, 1/4" X 12', STRAIGHT: Brand: MEDLINE

## (undated) DEVICE — VISUALIZATION SYSTEM: Brand: CLEARIFY

## (undated) DEVICE — SURGICAL PROCEDURE KIT GEN LAPAROSCOPY LF

## (undated) DEVICE — DRAPE,UTILTY,TAPE,15X26, 4EA/PK: Brand: MEDLINE

## (undated) DEVICE — BLACK REINFORCED INTELLIGENT RELOAD, FOR USE WITH SIGNIA STAPLING SYSTEM: Brand: TRI-STAPLE 2.0

## (undated) DEVICE — SUTURE SZ 0 27IN 5/8 CIR UR-6  TAPER PT VIOLET ABSRB VICRYL J603H

## (undated) DEVICE — CLICKLINE SCISSORS INSERT: Brand: CLICKLINE

## (undated) DEVICE — TROCAR: Brand: KII® SLEEVE

## (undated) DEVICE — VISIGI 3D®  CALIBRATION SYSTEM  SIZE 40FR STD W/ BULB: Brand: BOEHRINGER® VISIGI 3D™ SLEEVE GASTRECTOMY CALIBRATION SYSTEM, SIZE 40FR W/BULB

## (undated) DEVICE — Z INACTIVE USE 2240337 DRAPE SURG PT TRANSFER TRAWAY SHT

## (undated) DEVICE — NEEDLE HYPO 22GA L1.5IN BLK S STL HUB POLYPR SHLD REG BVL

## (undated) DEVICE — TROCAR SITE CLOSURE DEVICE: Brand: ENDO CLOSE

## (undated) DEVICE — POWER SHELL: Brand: SIGNIA

## (undated) DEVICE — STRAP,POSITIONING,KNEE/BODY,FOAM,4X60": Brand: MEDLINE

## (undated) DEVICE — REINFORCED INTELLIGENT RELOAD, FOR USE WITH SIGNIA STAPLING SYSTEM: Brand: TRI-STAPLE 2.0

## (undated) DEVICE — 4-PORT MANIFOLD: Brand: NEPTUNE 2

## (undated) DEVICE — DERMABOND SKIN ADH 0.7ML -- DERMABOND ADVANCED 12/BX

## (undated) DEVICE — DISSECTOR CRV JAW 48CM CRDLS -- SONICISION

## (undated) DEVICE — BNDG ADH FABRIC 2X4IN ST LF --

## (undated) DEVICE — FILTER SMK EVAC FLO CLR MEGADYNE